# Patient Record
Sex: FEMALE | Race: BLACK OR AFRICAN AMERICAN | NOT HISPANIC OR LATINO
[De-identification: names, ages, dates, MRNs, and addresses within clinical notes are randomized per-mention and may not be internally consistent; named-entity substitution may affect disease eponyms.]

---

## 2018-10-09 PROBLEM — Z00.00 ENCOUNTER FOR PREVENTIVE HEALTH EXAMINATION: Status: ACTIVE | Noted: 2018-10-09

## 2018-10-11 ENCOUNTER — APPOINTMENT (OUTPATIENT)
Dept: OTOLARYNGOLOGY | Facility: CLINIC | Age: 78
End: 2018-10-11
Payer: COMMERCIAL

## 2018-10-11 VITALS
HEIGHT: 63 IN | DIASTOLIC BLOOD PRESSURE: 83 MMHG | SYSTOLIC BLOOD PRESSURE: 143 MMHG | BODY MASS INDEX: 26.93 KG/M2 | HEART RATE: 84 BPM | WEIGHT: 152 LBS

## 2018-10-11 DIAGNOSIS — Z82.2 FAMILY HISTORY OF DEAFNESS AND HEARING LOSS: ICD-10-CM

## 2018-10-11 DIAGNOSIS — Z78.9 OTHER SPECIFIED HEALTH STATUS: ICD-10-CM

## 2018-10-11 DIAGNOSIS — Z86.39 PERSONAL HISTORY OF OTHER ENDOCRINE, NUTRITIONAL AND METABOLIC DISEASE: ICD-10-CM

## 2018-10-11 PROCEDURE — 99204 OFFICE O/P NEW MOD 45 MIN: CPT

## 2018-10-11 RX ORDER — LEVOTHYROXINE SODIUM 137 UG/1
TABLET ORAL
Refills: 0 | Status: ACTIVE | COMMUNITY

## 2018-10-11 RX ORDER — FLUTICASONE PROPIONATE 50 UG/1
50 SPRAY, METERED NASAL DAILY
Qty: 1 | Refills: 3 | Status: ACTIVE | COMMUNITY
Start: 2018-10-11 | End: 1900-01-01

## 2018-11-13 ENCOUNTER — APPOINTMENT (OUTPATIENT)
Dept: OTOLARYNGOLOGY | Facility: CLINIC | Age: 78
End: 2018-11-13
Payer: MEDICARE

## 2018-11-13 VITALS
SYSTOLIC BLOOD PRESSURE: 152 MMHG | DIASTOLIC BLOOD PRESSURE: 87 MMHG | BODY MASS INDEX: 26.93 KG/M2 | WEIGHT: 152 LBS | HEIGHT: 63 IN | HEART RATE: 84 BPM

## 2018-11-13 PROCEDURE — 99214 OFFICE O/P EST MOD 30 MIN: CPT | Mod: 25

## 2018-11-13 PROCEDURE — 92567 TYMPANOMETRY: CPT

## 2018-11-13 PROCEDURE — 92557 COMPREHENSIVE HEARING TEST: CPT

## 2018-11-13 RX ORDER — METHYLPREDNISOLONE 4 MG/1
4 TABLET ORAL
Qty: 1 | Refills: 0 | Status: ACTIVE | COMMUNITY
Start: 2018-11-13 | End: 1900-01-01

## 2018-11-26 ENCOUNTER — FORM ENCOUNTER (OUTPATIENT)
Age: 78
End: 2018-11-26

## 2018-11-27 ENCOUNTER — APPOINTMENT (OUTPATIENT)
Dept: OTOLARYNGOLOGY | Facility: CLINIC | Age: 78
End: 2018-11-27
Payer: MEDICARE

## 2018-11-27 ENCOUNTER — OUTPATIENT (OUTPATIENT)
Dept: OUTPATIENT SERVICES | Facility: HOSPITAL | Age: 78
LOS: 1 days | End: 2018-11-27
Payer: COMMERCIAL

## 2018-11-27 ENCOUNTER — APPOINTMENT (OUTPATIENT)
Dept: MRI IMAGING | Facility: IMAGING CENTER | Age: 78
End: 2018-11-27
Payer: MEDICARE

## 2018-11-27 DIAGNOSIS — H91.8X1 OTHER SPECIFIED HEARING LOSS, RIGHT EAR: ICD-10-CM

## 2018-11-27 PROCEDURE — 70553 MRI BRAIN STEM W/O & W/DYE: CPT

## 2018-11-27 PROCEDURE — 70553 MRI BRAIN STEM W/O & W/DYE: CPT | Mod: 26

## 2018-11-27 PROCEDURE — A9585: CPT

## 2018-12-06 ENCOUNTER — APPOINTMENT (OUTPATIENT)
Dept: OTOLARYNGOLOGY | Facility: CLINIC | Age: 78
End: 2018-12-06
Payer: MEDICARE

## 2018-12-06 VITALS
WEIGHT: 152 LBS | BODY MASS INDEX: 26.93 KG/M2 | DIASTOLIC BLOOD PRESSURE: 88 MMHG | HEIGHT: 63 IN | HEART RATE: 80 BPM | SYSTOLIC BLOOD PRESSURE: 165 MMHG

## 2018-12-06 PROCEDURE — 99214 OFFICE O/P EST MOD 30 MIN: CPT | Mod: 25

## 2018-12-06 PROCEDURE — 31231 NASAL ENDOSCOPY DX: CPT

## 2019-02-15 ENCOUNTER — EMERGENCY (EMERGENCY)
Facility: HOSPITAL | Age: 79
LOS: 1 days | Discharge: HOME | End: 2019-02-15
Attending: EMERGENCY MEDICINE | Admitting: EMERGENCY MEDICINE
Payer: MEDICARE

## 2019-02-15 VITALS
DIASTOLIC BLOOD PRESSURE: 64 MMHG | OXYGEN SATURATION: 100 % | TEMPERATURE: 98 F | SYSTOLIC BLOOD PRESSURE: 122 MMHG | HEART RATE: 85 BPM | RESPIRATION RATE: 18 BRPM

## 2019-02-15 PROCEDURE — 99284 EMERGENCY DEPT VISIT MOD MDM: CPT | Mod: GC,25

## 2019-02-15 NOTE — ED ADULT TRIAGE NOTE - CHIEF COMPLAINT QUOTE
Pt BIBEMS NSLIJ from Home, c/o Epigastric pain with Dizziness, Nausea Vomiting Diarrhea, Generalized Body Weakness PMHx Hypothyroid Pt BIBEMS [facility]LIJ from Home, c/o Epigastric pain with Dizziness, Nausea Vomiting Diarrhea, Generalized Body Weakness PMHx Hypothyroid. Looks uncomfortable in Triage Pt BIBEMS NS[facility]J from Home, c/o Epigastric pain, constant since 9pm,non radiating with Dizziness, Nausea Vomiting Diarrhea, Generalized Body Weakness PMHx Hypothyroid. Looks uncomfortable in Triage

## 2019-02-16 VITALS
HEART RATE: 85 BPM | OXYGEN SATURATION: 100 % | RESPIRATION RATE: 16 BRPM | SYSTOLIC BLOOD PRESSURE: 146 MMHG | DIASTOLIC BLOOD PRESSURE: 79 MMHG | TEMPERATURE: 98 F

## 2019-02-16 LAB
ALBUMIN SERPL ELPH-MCNC: 4.6 G/DL — SIGNIFICANT CHANGE UP (ref 3.3–5)
ALP SERPL-CCNC: 92 U/L — SIGNIFICANT CHANGE UP (ref 40–120)
ALT FLD-CCNC: 13 U/L — SIGNIFICANT CHANGE UP (ref 4–33)
ANION GAP SERPL CALC-SCNC: 16 MMO/L — HIGH (ref 7–14)
AST SERPL-CCNC: 18 U/L — SIGNIFICANT CHANGE UP (ref 4–32)
BASE EXCESS BLDV CALC-SCNC: 4.7 MMOL/L — SIGNIFICANT CHANGE UP
BASOPHILS # BLD AUTO: 0.03 K/UL — SIGNIFICANT CHANGE UP (ref 0–0.2)
BASOPHILS NFR BLD AUTO: 0.4 % — SIGNIFICANT CHANGE UP (ref 0–2)
BILIRUB SERPL-MCNC: 0.5 MG/DL — SIGNIFICANT CHANGE UP (ref 0.2–1.2)
BLOOD GAS VENOUS - CREATININE: 0.71 MG/DL — SIGNIFICANT CHANGE UP (ref 0.5–1.3)
BUN SERPL-MCNC: 15 MG/DL — SIGNIFICANT CHANGE UP (ref 7–23)
CALCIUM SERPL-MCNC: 9.6 MG/DL — SIGNIFICANT CHANGE UP (ref 8.4–10.5)
CHLORIDE BLDV-SCNC: 104 MMOL/L — SIGNIFICANT CHANGE UP (ref 96–108)
CHLORIDE SERPL-SCNC: 101 MMOL/L — SIGNIFICANT CHANGE UP (ref 98–107)
CO2 SERPL-SCNC: 25 MMOL/L — SIGNIFICANT CHANGE UP (ref 22–31)
CREAT SERPL-MCNC: 0.73 MG/DL — SIGNIFICANT CHANGE UP (ref 0.5–1.3)
EOSINOPHIL # BLD AUTO: 0.05 K/UL — SIGNIFICANT CHANGE UP (ref 0–0.5)
EOSINOPHIL NFR BLD AUTO: 0.6 % — SIGNIFICANT CHANGE UP (ref 0–6)
GAS PNL BLDV: 141 MMOL/L — SIGNIFICANT CHANGE UP (ref 136–146)
GLUCOSE BLDV-MCNC: 180 — HIGH (ref 70–99)
GLUCOSE SERPL-MCNC: 177 MG/DL — HIGH (ref 70–99)
HCO3 BLDV-SCNC: 26 MMOL/L — SIGNIFICANT CHANGE UP (ref 20–27)
HCT VFR BLD CALC: 41.8 % — SIGNIFICANT CHANGE UP (ref 34.5–45)
HCT VFR BLDV CALC: 42.2 % — SIGNIFICANT CHANGE UP (ref 34.5–45)
HGB BLD-MCNC: 13.3 G/DL — SIGNIFICANT CHANGE UP (ref 11.5–15.5)
HGB BLDV-MCNC: 13.7 G/DL — SIGNIFICANT CHANGE UP (ref 11.5–15.5)
IMM GRANULOCYTES NFR BLD AUTO: 0.5 % — SIGNIFICANT CHANGE UP (ref 0–1.5)
LACTATE BLDV-MCNC: 2.4 MMOL/L — HIGH (ref 0.5–2)
LIDOCAIN IGE QN: 31.6 U/L — SIGNIFICANT CHANGE UP (ref 7–60)
LYMPHOCYTES # BLD AUTO: 1.39 K/UL — SIGNIFICANT CHANGE UP (ref 1–3.3)
LYMPHOCYTES # BLD AUTO: 16.4 % — SIGNIFICANT CHANGE UP (ref 13–44)
MCHC RBC-ENTMCNC: 28.2 PG — SIGNIFICANT CHANGE UP (ref 27–34)
MCHC RBC-ENTMCNC: 31.8 % — LOW (ref 32–36)
MCV RBC AUTO: 88.7 FL — SIGNIFICANT CHANGE UP (ref 80–100)
MONOCYTES # BLD AUTO: 0.57 K/UL — SIGNIFICANT CHANGE UP (ref 0–0.9)
MONOCYTES NFR BLD AUTO: 6.7 % — SIGNIFICANT CHANGE UP (ref 2–14)
NEUTROPHILS # BLD AUTO: 6.38 K/UL — SIGNIFICANT CHANGE UP (ref 1.8–7.4)
NEUTROPHILS NFR BLD AUTO: 75.4 % — SIGNIFICANT CHANGE UP (ref 43–77)
NRBC # FLD: 0 K/UL — LOW (ref 25–125)
PCO2 BLDV: 49 MMHG — SIGNIFICANT CHANGE UP (ref 41–51)
PH BLDV: 7.39 PH — SIGNIFICANT CHANGE UP (ref 7.32–7.43)
PLATELET # BLD AUTO: 262 K/UL — SIGNIFICANT CHANGE UP (ref 150–400)
PMV BLD: 9.7 FL — SIGNIFICANT CHANGE UP (ref 7–13)
PO2 BLDV: < 24 MMHG — LOW (ref 35–40)
POTASSIUM BLDV-SCNC: 3.5 MMOL/L — SIGNIFICANT CHANGE UP (ref 3.4–4.5)
POTASSIUM SERPL-MCNC: 3.9 MMOL/L — SIGNIFICANT CHANGE UP (ref 3.5–5.3)
POTASSIUM SERPL-SCNC: 3.9 MMOL/L — SIGNIFICANT CHANGE UP (ref 3.5–5.3)
PROT SERPL-MCNC: 7.4 G/DL — SIGNIFICANT CHANGE UP (ref 6–8.3)
RBC # BLD: 4.71 M/UL — SIGNIFICANT CHANGE UP (ref 3.8–5.2)
RBC # FLD: 12.4 % — SIGNIFICANT CHANGE UP (ref 10.3–14.5)
SAO2 % BLDV: 26.8 % — LOW (ref 60–85)
SODIUM SERPL-SCNC: 142 MMOL/L — SIGNIFICANT CHANGE UP (ref 135–145)
WBC # BLD: 8.46 K/UL — SIGNIFICANT CHANGE UP (ref 3.8–10.5)
WBC # FLD AUTO: 8.46 K/UL — SIGNIFICANT CHANGE UP (ref 3.8–10.5)

## 2019-02-16 PROCEDURE — 74177 CT ABD & PELVIS W/CONTRAST: CPT | Mod: 26

## 2019-02-16 RX ORDER — MECLIZINE HCL 12.5 MG
50 TABLET ORAL ONCE
Qty: 0 | Refills: 0 | Status: COMPLETED | OUTPATIENT
Start: 2019-02-16 | End: 2019-02-16

## 2019-02-16 RX ORDER — SODIUM CHLORIDE 9 MG/ML
1000 INJECTION INTRAMUSCULAR; INTRAVENOUS; SUBCUTANEOUS ONCE
Qty: 0 | Refills: 0 | Status: COMPLETED | OUTPATIENT
Start: 2019-02-16 | End: 2019-02-16

## 2019-02-16 RX ORDER — ONDANSETRON 8 MG/1
4 TABLET, FILM COATED ORAL ONCE
Qty: 0 | Refills: 0 | Status: COMPLETED | OUTPATIENT
Start: 2019-02-16 | End: 2019-02-16

## 2019-02-16 RX ORDER — MECLIZINE HCL 12.5 MG
1 TABLET ORAL
Qty: 30 | Refills: 0
Start: 2019-02-16 | End: 2019-02-25

## 2019-02-16 RX ADMIN — Medication 50 MILLIGRAM(S): at 02:14

## 2019-02-16 RX ADMIN — SODIUM CHLORIDE 1000 MILLILITER(S): 9 INJECTION INTRAMUSCULAR; INTRAVENOUS; SUBCUTANEOUS at 00:53

## 2019-02-16 RX ADMIN — ONDANSETRON 4 MILLIGRAM(S): 8 TABLET, FILM COATED ORAL at 01:17

## 2019-02-16 RX ADMIN — Medication 30 MILLILITER(S): at 01:17

## 2019-02-16 NOTE — ED PROVIDER NOTE - NS ED ROS FT
Please see HPI section of chart for further detailed Review of Systems    abd discomfort / pain  nausea, 3 episodes diarrhea, 4 episodes vomiting, weakness, lightheadedness, chills.

## 2019-02-16 NOTE — ED PROVIDER NOTE - PROGRESS NOTE DETAILS
Ct w/o acute pathology, labs grossly wnl  Patient reports improvement of symptoms  will d/c home with strict return precautions, meclizine, instructions to f/u with ENT  Marquise Quiñones MD, PGY2 Emergency Medicine

## 2019-02-16 NOTE — ED ADULT NURSE NOTE - NSIMPLEMENTINTERV_GEN_ALL_ED
Implemented All Universal Safety Interventions:  [place] to call system. Call bell, personal items and telephone within reach. Instruct patient to call for assistance. Room bathroom lighting operational. Non-slip footwear when patient is off stretcher. Physically safe environment: no spills, clutter or unnecessary equipment. Stretcher in lowest position, wheels locked, appropriate side rails in place.

## 2019-02-16 NOTE — ED PROVIDER NOTE - OBJECTIVE STATEMENT
78F, hx of HYPothyroid, HLD presents w chief complaint of left sided abdominal pain / discomfort. Per patient, sx began around 9pm tonight, associated w/ nausea, 3 episodes diarrhea, 4 episodes vomiting, weakness, lightheadedness, chills. abd discomfort constant, looks ill appearing. Denies fevers, chest pain, shortness of breath, cough, urinary sx, headache, dizziness, blurry vision. Denies prior hx similar. No recent illness, sick contacts, illness. No new foods or medications. Mo other individuals at home with similar symptoms. No tobacco, ethanol, or drug use. No hx abd surgery.

## 2019-02-16 NOTE — ED PROVIDER NOTE - ATTENDING CONTRIBUTION TO CARE
MD Andujar:  I performed a face to face bedside interview with patient regarding history of present illness, review of symptoms and past medical history. I completed an independent physical exam(documented below).  I have discussed patient's plan of care with resident.   I agree with note as stated above, having amended the EMR as needed to reflect my findings. I have discussed the assessment and plan of care.  This includes during the time I functioned as the attending physician for this patient.  PE:  Gen: Alert, NAD  Head: NC, AT,  EOMI; +horizontal nystagmus on Rightward gaze (7beats), normal lids/conjunctiva  ENT:  normal hearing, patent oropharynx without erythema/exudate  Neck: +supple, no tenderness/meningismus/JVD, +Trachea midline  Chest: no chest wall tenderness, equal chest rise  Pulm: Bilateral BS, normal resp effort, no wheeze/stridor/retractions  CV: tachy, no M/R/G, +dist pulses  Abd: +BS, soft, ND, mild left abd discomfort.  Rectal: deferred  Mskel: no edema/erythema/cyanosis  Skin: no rash  Neuro: AAOx3  MDM:  79yo F pmh of hypothyroidism, hld, c/o room-spinning dizziness last night, followed by vomiting and loose stools. Transient abd discomfort earlier, none at time of my evaluation. States she ate left overs ystdy afternoon that rest of family did not consume. Denies cp, sob, diaphoresis. No hx of cardiac dx. No PE risk factors.  Ddx includes vertigo (thought wouldn't explain loose stools) vs gastroenteritis vs diverticulitis (less likely).  Labs, meds, imaging, reassess.

## 2019-02-16 NOTE — ED PROVIDER NOTE - CLINICAL SUMMARY MEDICAL DECISION MAKING FREE TEXT BOX
78F, hx of HYPothyroid, HLD presents w chief complaint of left sided abdominal pain / discomfort. Associated with nausea, vomiting, diarrhea, chills, weakness. Exam as above. Concern for gastroenteritis vs diverticulitis vs other. Labs, CT, pain control, zofran, maalox. Currently stable, no acute distress. Will continue to follow up and re-assess. Case discussed with Attending  Marquise Quiñones MD, PGY2 Emergency Medicine

## 2019-02-16 NOTE — ED PROVIDER NOTE - NSFOLLOWUPCLINICS_GEN_ALL_ED_FT
[facility] - ENT  Otolaryngology (ENT)  [address] [place] [address]  Phone: [phone]  Fax:   Follow Up Time:

## 2019-02-16 NOTE — ED ADULT NURSE REASSESSMENT NOTE - NS ED NURSE REASSESS COMMENT FT1
break coverage RN: received report from FOUZIA Blair, pt resting comfortably in stretcher and in nad, will ctm.

## 2019-02-16 NOTE — ED ADULT NURSE NOTE - CHIEF COMPLAINT QUOTE
Pt BIBEMS NSLIJ from Home, c/o Epigastric pain, constant since 9pm,non radiating with Dizziness, Nausea Vomiting Diarrhea, Generalized Body Weakness PMHx Hypothyroid. Looks uncomfortable in Triage

## 2019-02-16 NOTE — ED PROVIDER NOTE - NSFOLLOWUPINSTRUCTIONS_ED_ALL_ED_FT
Please follow up with your primary medical doctor this week  Please follow up with ENT if your symptoms (dizziness, head spinning) recur or persist    Take Meclizine 25mg up to three times per day for symptoms relief (dizziness, nausea)    Return to hospital for any new or concerning symptoms, including but not limited to: fevers, chills, nausea, vomiting, headache, dizziness, lightheadedness, chest pain, shortness of breath, difficulty breathing, abdominal pain, weakness, or any other new or concerning symptoms.

## 2019-02-16 NOTE — ED PROVIDER NOTE - ABDOMINAL EXAM
mild tenderness to mid/left abd . no epigastric or right upper quadrant tenderness/soft/nondistended

## 2019-02-16 NOTE — ED ADULT NURSE NOTE - OBJECTIVE STATEMENT
Pt presents to room 28, A&Ox4, ambulatory at baseline w/o assistance, pmhx of hypothyroidism, here for evaluation of dizziness, nausea, vomiting, diarrhea x4 that began today at 9pm. Denies chest pain, shortness of breath, palpitations, diaphoresis, headaches, fevers, dizziness, nausea, vomiting, diarrhea, or urinary symptoms at this time. IV established in left AC with a 20G, labs drawn and sent, call bell in reach, warm blanket provided, bed in lowest position, side rails up x2. Will continue to monitor.

## 2019-03-14 ENCOUNTER — APPOINTMENT (OUTPATIENT)
Dept: OTOLARYNGOLOGY | Facility: CLINIC | Age: 79
End: 2019-03-14
Payer: MEDICARE

## 2019-03-14 VITALS
HEART RATE: 72 BPM | SYSTOLIC BLOOD PRESSURE: 143 MMHG | DIASTOLIC BLOOD PRESSURE: 86 MMHG | BODY MASS INDEX: 26.93 KG/M2 | HEIGHT: 63 IN | WEIGHT: 152 LBS

## 2019-03-14 DIAGNOSIS — H69.81 OTHER SPECIFIED DISORDERS OF EUSTACHIAN TUBE, RIGHT EAR: ICD-10-CM

## 2019-03-14 PROCEDURE — 99214 OFFICE O/P EST MOD 30 MIN: CPT

## 2019-03-14 RX ORDER — MECLIZINE HYDROCHLORIDE 12.5 MG/1
12.5 TABLET ORAL
Qty: 60 | Refills: 0 | Status: ACTIVE | COMMUNITY
Start: 2019-03-07

## 2019-03-14 RX ORDER — MECLIZINE HYDROCHLORIDE 25 MG/1
25 TABLET ORAL
Qty: 30 | Refills: 0 | Status: ACTIVE | COMMUNITY
Start: 2019-02-16

## 2019-03-14 NOTE — HISTORY OF PRESENT ILLNESS
[de-identified] : patient woke up a few weeks ago with right ear clogging and fullness. SHe tried to ignore it but as the day progressed it worsened and bothered her until the evening she felt minor dizziness. She got so dizzy that she had to lay down and the room was spinning and she felt nauseous about 5 minutes later where she threw up and had diarrhea. She felt faint after all of this and was taken to the hospital by ambulance still vomiting. She developed a pain in the left side of her body under her ribs. They thought it was food poisoning but she didn’t think it was. They did an abdominal CT and it was normal so they decided it was the inner ear causing her dizziness. She has been taking meclizine as needed for dizziness. She is scared now to drive and anxious at any time she feels even slightly dizzy.

## 2019-03-14 NOTE — ASSESSMENT
[FreeTextEntry1] : Patient he apparently had a bed upper respiratory tract infection that resulted in some dizziness and being hospitalized nauseousness and diarrhea. She was started on meclizine but couldn't really take it. She's feeling much better now. She remains clogged in her right ear. MRI in the past was negative. I sent her for a VNG  to definitively evaluate her vestibular status and strongly recommend she inserted a hearing aid for her right ear which she continues to feel feels clogged. This is due to her hearing loss.

## 2019-05-10 ENCOUNTER — APPOINTMENT (OUTPATIENT)
Dept: OTOLARYNGOLOGY | Facility: CLINIC | Age: 79
End: 2019-05-10
Payer: MEDICARE

## 2019-05-10 PROCEDURE — 92540 BASIC VESTIBULAR EVALUATION: CPT

## 2019-05-10 PROCEDURE — 92567 TYMPANOMETRY: CPT

## 2019-05-10 PROCEDURE — 92537 CALORIC VSTBLR TEST W/REC: CPT

## 2019-05-17 ENCOUNTER — APPOINTMENT (OUTPATIENT)
Dept: PHARMACY | Facility: CLINIC | Age: 79
End: 2019-05-17
Payer: SELF-PAY

## 2019-05-17 PROCEDURE — V5010C: CUSTOM | Mod: NC,RT

## 2019-09-05 ENCOUNTER — APPOINTMENT (OUTPATIENT)
Dept: OTOLARYNGOLOGY | Facility: CLINIC | Age: 79
End: 2019-09-05
Payer: MEDICARE

## 2019-09-05 VITALS
HEART RATE: 76 BPM | SYSTOLIC BLOOD PRESSURE: 137 MMHG | BODY MASS INDEX: 25.69 KG/M2 | WEIGHT: 145 LBS | HEIGHT: 63 IN | DIASTOLIC BLOOD PRESSURE: 75 MMHG

## 2019-09-05 DIAGNOSIS — H91.8X1 OTHER SPECIFIED HEARING LOSS, RIGHT EAR: ICD-10-CM

## 2019-09-05 DIAGNOSIS — H61.23 IMPACTED CERUMEN, BILATERAL: ICD-10-CM

## 2019-09-05 PROCEDURE — 69210 REMOVE IMPACTED EAR WAX UNI: CPT

## 2019-09-05 PROCEDURE — 99214 OFFICE O/P EST MOD 30 MIN: CPT | Mod: 25

## 2019-09-05 NOTE — HISTORY OF PRESENT ILLNESS
[de-identified] : PAtient has been feeling like her hearing is the same but she still does not want to use hearnig aids. SHe felt nosies in the ears last week, this comes and goes like an air coming out of the ear. This morning she had a runny nose and her right ear felt mildly clogged. She does not have any current nasal congestion right now. She does have an issue with dizziness as well that comes and goes  but right now she is doing well. Her VNG was done awhile back and the results were discussing with her from Dr Farmer. THe neurologist she saw would like to talk to Dr Farmer as well to discuss the plan and whats happening with the noise in the ears.

## 2019-09-05 NOTE — REVIEW OF SYSTEMS
[Hearing Loss] : hearing loss [Ear Noises] : ear noises [Dizziness] : dizziness [Negative] : Heme/Lymph [de-identified] : no current vertigo

## 2019-09-05 NOTE — ASSESSMENT
[FreeTextEntry1] : Patient follows up with vestibular status was essentially normal body the NG she continues to have tinnitus that she understands may go away on its own but there's no treatment pedal automatically make a coli. Some cerumen impaction bilaterally which was curetted out she cannot tolerate any hearing aides and wants to followup with us on an annual basis for cerumen management.

## 2019-11-23 ENCOUNTER — EMERGENCY (EMERGENCY)
Facility: HOSPITAL | Age: 79
LOS: 1 days | Discharge: HOME | End: 2019-11-23
Attending: EMERGENCY MEDICINE | Admitting: EMERGENCY MEDICINE
Payer: MEDICARE

## 2019-11-23 VITALS
RESPIRATION RATE: 17 BRPM | OXYGEN SATURATION: 94 % | HEART RATE: 70 BPM | SYSTOLIC BLOOD PRESSURE: 117 MMHG | TEMPERATURE: 98 F | DIASTOLIC BLOOD PRESSURE: 77 MMHG

## 2019-11-23 VITALS
SYSTOLIC BLOOD PRESSURE: 163 MMHG | HEART RATE: 85 BPM | TEMPERATURE: 98 F | DIASTOLIC BLOOD PRESSURE: 82 MMHG | RESPIRATION RATE: 17 BRPM | OXYGEN SATURATION: 100 %

## 2019-11-23 PROCEDURE — 99284 EMERGENCY DEPT VISIT MOD MDM: CPT

## 2019-11-23 PROCEDURE — 72125 CT NECK SPINE W/O DYE: CPT | Mod: 26

## 2019-11-23 PROCEDURE — 70450 CT HEAD/BRAIN W/O DYE: CPT | Mod: 26

## 2019-11-23 RX ORDER — SODIUM CHLORIDE 9 MG/ML
500 INJECTION INTRAMUSCULAR; INTRAVENOUS; SUBCUTANEOUS ONCE
Refills: 0 | Status: COMPLETED | OUTPATIENT
Start: 2019-11-23 | End: 2019-11-23

## 2019-11-23 RX ORDER — ACETAMINOPHEN 500 MG
650 TABLET ORAL ONCE
Refills: 0 | Status: COMPLETED | OUTPATIENT
Start: 2019-11-23 | End: 2019-11-23

## 2019-11-23 RX ORDER — METOCLOPRAMIDE HCL 10 MG
10 TABLET ORAL ONCE
Refills: 0 | Status: COMPLETED | OUTPATIENT
Start: 2019-11-23 | End: 2019-11-23

## 2019-11-23 RX ADMIN — SODIUM CHLORIDE 500 MILLILITER(S): 9 INJECTION INTRAMUSCULAR; INTRAVENOUS; SUBCUTANEOUS at 18:50

## 2019-11-23 RX ADMIN — Medication 10 MILLIGRAM(S): at 18:49

## 2019-11-23 RX ADMIN — Medication 650 MILLIGRAM(S): at 18:48
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
## 2019-11-23 NOTE — ED ADULT NURSE NOTE - OBJECTIVE STATEMENT
Pt received in intake 10C, A&OX4, NAD.  c/o overall headache since yesterday.  Denies any vision changes.  Pt denies taking any medications for headache.  Pt nausea/vomiting.  Pt appears comfortable. IVL placed, no labs required at this time, medicated as per MD orders.  Pt pending CT scan.

## 2019-11-23 NOTE — ED ADULT NURSE NOTE - NSIMPLEMENTINTERV_GEN_ALL_ED
Implemented All Universal Safety Interventions:  [place] to call system. Call bell, personal items and telephone within reach. Instruct patient to call for assistance. Room bathroom lighting operational. Non-slip footwear when patient is off stretcher. Physically safe environment: no spills, clutter or unnecessary equipment. Stretcher in lowest position, wheels locked, appropriate side rails in place.

## 2019-11-23 NOTE — ED PROVIDER NOTE - NSFOLLOWUPINSTRUCTIONS_ED_ALL_ED_FT
A cause for your symptoms was notfound. You might have a concussion.     Please take Ibuprofen 600 mg every 6 hours and/or Tylenol 650 mg every 6 hours as needed for pain.     Return to the ER for uncontrolled pain, fevers, vomiting, vision changes, or other concerning signs since you may need more testing . Be sure to follow up with your doctor in 3-4 days, especially if you're not feeling better.
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
## 2019-11-23 NOTE — ED PROVIDER NOTE - PROGRESS NOTE DETAILS
Pain improved somewhat, though still mildly present . CT reviewed c pt . To f/u c PMD. Return precautiosn reviwed

## 2020-05-21 PROBLEM — E03.9 HYPOTHYROIDISM, UNSPECIFIED: Chronic | Status: ACTIVE | Noted: 2019-11-23

## 2020-06-01 ENCOUNTER — APPOINTMENT (OUTPATIENT)
Dept: OTOLARYNGOLOGY | Facility: CLINIC | Age: 80
End: 2020-06-01
Payer: MEDICARE

## 2020-06-01 VITALS — DIASTOLIC BLOOD PRESSURE: 83 MMHG | HEART RATE: 84 BPM | TEMPERATURE: 98.42 F | SYSTOLIC BLOOD PRESSURE: 133 MMHG

## 2020-06-01 DIAGNOSIS — R51 HEADACHE: ICD-10-CM

## 2020-06-01 DIAGNOSIS — H90.3 SENSORINEURAL HEARING LOSS, BILATERAL: ICD-10-CM

## 2020-06-01 DIAGNOSIS — H61.22 IMPACTED CERUMEN, LEFT EAR: ICD-10-CM

## 2020-06-01 DIAGNOSIS — H69.82 OTHER SPECIFIED DISORDERS OF EUSTACHIAN TUBE, LEFT EAR: ICD-10-CM

## 2020-06-01 DIAGNOSIS — R42 DIZZINESS AND GIDDINESS: ICD-10-CM

## 2020-06-01 PROCEDURE — 99214 OFFICE O/P EST MOD 30 MIN: CPT | Mod: 25

## 2020-06-01 PROCEDURE — 69210 REMOVE IMPACTED EAR WAX UNI: CPT

## 2020-06-01 NOTE — HISTORY OF PRESENT ILLNESS
[de-identified] : PAtient is starting to have pain and headache on the left side of the head starting in the left ear. It radiates to the top of the head. SHe cannot tell if the hearing in the left ear has been affected. SHe does not have any drainage from the ear or dizziness recently. SHe had the VNG last year that was normal.

## 2020-06-01 NOTE — REVIEW OF SYSTEMS
[Ear Pain] : ear pain [Dizziness] : dizziness [Hearing Loss] : hearing loss [Ear Noises] : ear noises [Negative] : Heme/Lymph [FreeTextEntry4] : generalized headache as per HPI

## 2020-06-01 NOTE — ASSESSMENT
[FreeTextEntry1] : Patient follows up been under tremendous amount of stress during COVID feeling some pain in the left temporal region radiating from her ear on examination her ear has some moderate amount of wax which was curetted out she does have tenderness over the left temporomandibular joint region this is most likely etiology of her symptoms the rest of examinations essentially within normal limits the good news is that her tinnitus comes and goes she does not want to pursue with a hearing aid at this time even though she knows that she is a candidate for hearing aid.  Work-up MRI and VNG's have been negative she was reassured she will follow-up with us as needed.  In the meantime she will take Motrin for her discomfort I fully expect her symptoms to resolve and she understands is stress related.

## 2020-08-06 ENCOUNTER — APPOINTMENT (OUTPATIENT)
Dept: ORTHOPEDIC SURGERY | Facility: CLINIC | Age: 80
End: 2020-08-06
Payer: MEDICARE

## 2020-08-06 VITALS — WEIGHT: 140 LBS | HEIGHT: 62 IN | BODY MASS INDEX: 25.76 KG/M2

## 2020-08-06 DIAGNOSIS — M70.71 OTHER BURSITIS OF HIP, RIGHT HIP: ICD-10-CM

## 2020-08-06 DIAGNOSIS — M70.72 OTHER BURSITIS OF HIP, RIGHT HIP: ICD-10-CM

## 2020-08-06 DIAGNOSIS — M47.816 SPONDYLOSIS W/OUT MYELOPATHY OR RADICULOPATHY, LUMBAR REGION: ICD-10-CM

## 2020-08-06 DIAGNOSIS — S90.32XA CONTUSION OF LEFT FOOT, INITIAL ENCOUNTER: ICD-10-CM

## 2020-08-06 PROCEDURE — 99204 OFFICE O/P NEW MOD 45 MIN: CPT

## 2020-08-06 PROCEDURE — 72170 X-RAY EXAM OF PELVIS: CPT

## 2020-08-06 PROCEDURE — 72100 X-RAY EXAM L-S SPINE 2/3 VWS: CPT

## 2020-08-06 NOTE — PHYSICAL EXAM
[de-identified] : Constitutional\par o Appearance : well-nourished, well developed, alert, in no acute distress \par Head and Face\par o Head :\par ¦ Inspection : atraumatic, normocephalic\par o Face :\par ¦ Inspection : no visible rash or discoloration\par Respiratory\par o Respiratory Effort: breathing unlabored \par Neurologic\par o Mental Status Examination :\par ¦ Orientation : alert and oriented X 3\par Psychiatric\par o Mood and Affect: mood normal, affect appropriate\par Cardiovascular\par o Observation/Palpation : - no swelling\par Lymphatic\par o Additional Nodes : No palpable lymph nodes present\par \par Lumbosacral Spine\par o Inspection : no visible rash or discoloration\par o Palpation : paraspinal musculature nontender\par o Range of Motion : rotation to the left cause discomfort, no discomfort with extension, sidebending or rotation to the right\par o Muscle Strength : paraspinal muscle strength and tone within normal limits\par o Muscle Tone : paraspinal muscle strength and tone within normal limits\par Tests: straight leg test negative\par  \par Right Lower Extremity\par o Buttock : no tenderness, swelling or deformities\par o Right Hip :\par ¦ Inspection/Palpation : no tenderness, swelling or deformities\par ¦ Range of Motion : full and painless in all planes, no crepitance\par ¦ Stability : joint stability intact\par ¦ Strength : extension, flexion, adduction, abduction, internal rotation and external rotation, 4+/5 \par \par Left Lower Extremity\par o Buttock : no tenderness, swelling or deformities \par o Left Hip :\par ¦ Inspection/Palpation : greater trochanteric and IT band tenderness, no swelling or deformities\par ¦ Range of Motion : full flexion, pain with full external rotation, mild pain with full internal rotation, no crepitance\par ¦ Stability : joint stability intact\par ¦ Strength : extension, flexion, adduction, abduction, internal rotation and external rotation, 3+/5\par \par o Left Ankle :\par ¦ Inspection/Palpation : no tenderness to palpation, no swelling    \par ¦ Range of Motion : arc of motion full and painless in all planes\par ¦ Stability : no joint instability en provocative testing\par ¦ Strength : all muscles 5/5\par o Skin : no erythema or ecchymosis present\par o Sensation : sensation to pin intact\par ¦ Tests and Signs : Blake's test negative \par \par o Left Foot:\par ¦ Inspection/Palpation : tenderness over the plantar fascia, no tenderness over the calcaneus, no swelling\par ¦ Range of Motion : arc of motion full and painless in all planes\par ¦ Stability : no joint instability on provocative testing\par ¦ Strength : all muscles 5/5\par o Skin : no erythema or ecchymosis present\par  \par Gait: kyphotic, antalgic on the left, limping on the left, no significant extremity swelling or lymphedema, moderately limited core strength\par \par Radiology Results \par o Lumbosacral Spine : AP and lateral views were obtained and reveal diffuse facet arthropathy with foraminal stenosis at L5/S1, evidence of a gallbladder surgery.\par o Pelvis: AP pelvis was obtained and revealed 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

## 2020-08-06 NOTE — HISTORY OF PRESENT ILLNESS
[de-identified] : 80 year old female presents complaining of left leg pain, worse in the thigh and ankle, that started about 1 month ago. She had been doing some walking exercises in her house with flip flops when she first felt the discomfort in her ankle and foot. Her pain has since migrated towards her left thigh and hip. She has mild low back pain. She denies any falls. She denies numbness and tingling into her legs and feet. She has pain when walking and standing, as well as when laying on her left side at night. She has not been treated for this in the past. She presented with lab work that was reviewed with her, and her CBC, sedimentation rate, and CRP were all normal. She is a retired medical technician.

## 2020-08-06 NOTE — DISCUSSION/SUMMARY
[de-identified] : I discussed the underlying pathophysiology of the patient's condition in great detail with the patient. I went over the patient's x-rays with them in great detail. We discussed the use of ice, Tylenol and anti-inflammatories to relieve pain. The patient was instructed in ROM exercises they are to do at home. At-home strengthening, and stretching exercises were discussed and demonstrated with the patient. We went over the wide ranging benefits of exercise for the patient health and I encouraged her to begin a diligent exercise program. She should avoid any heavy lifting, carrying, or overhead activities. She should not lift anything higher than 90° past her shoulder level. At this time, she will start a course of physical therapy for strengthening and flexibility. A prescription for physical therapy was provided. A discussion was had about shoe-wear modifications. I advised the patient to utilize a supportive sneaker that has a thicker sole. I recommend that she can take Celebrex to better manage her pain, and a prescription was provided. All of her questions were answered. She understands and consents to the plan. This treatment plan was discussed and reviewed with the patient's daughter who agrees with the treatment course.\par \par FU 1 month.\par after undergoing PT for her left hip and left ankle.\par after taking Celebrex to better manage her pain.\par after shoe wear modifications.

## 2020-08-06 NOTE — CONSULT LETTER
[Dear  ___] : Dear  [unfilled], [Consult Letter:] : I had the pleasure of evaluating your patient, [unfilled]. [Please see my note below.] : Please see my note below. [Consult Closing:] : Thank you very much for allowing me to participate in the care of this patient.  If you have any questions, please do not hesitate to contact me. [Sincerely,] : Sincerely, [FreeTextEntry3] : Popeye Pinto M.D.

## 2020-08-06 NOTE — ADDENDUM
[FreeTextEntry1] : I, Herbert Mckee, acted solely as a scribe for Dr. Popeye Pinto on this date 08/06/2020.\par All medical record entries made by the Scribe were at my, Dr. Popeye Pinto, direction and personally dictated by me on 08/06/2020. I have reviewed the chart and agree that the record accurately reflects my personal performance of the history, physical exam, assessment and plan. I have also personally directed, reviewed, and agreed with the chart.

## 2020-08-10 RX ORDER — CELECOXIB 200 MG/1
200 CAPSULE ORAL
Qty: 30 | Refills: 3 | Status: ACTIVE | COMMUNITY
Start: 2020-08-10 | End: 1900-01-01

## 2020-09-14 ENCOUNTER — APPOINTMENT (OUTPATIENT)
Dept: ORTHOPEDIC SURGERY | Facility: CLINIC | Age: 80
End: 2020-09-14

## 2021-01-18 ENCOUNTER — RESULT REVIEW (OUTPATIENT)
Age: 81
End: 2021-01-18

## 2021-02-01 ENCOUNTER — APPOINTMENT (OUTPATIENT)
Dept: ORTHOPEDIC SURGERY | Facility: CLINIC | Age: 81
End: 2021-02-01

## 2021-02-10 ENCOUNTER — RESULT REVIEW (OUTPATIENT)
Age: 81
End: 2021-02-10

## 2021-02-21 ENCOUNTER — RESULT REVIEW (OUTPATIENT)
Age: 81
End: 2021-02-21

## 2021-02-22 ENCOUNTER — RESULT REVIEW (OUTPATIENT)
Age: 81
End: 2021-02-22

## 2021-07-07 ENCOUNTER — RESULT REVIEW (OUTPATIENT)
Age: 81
End: 2021-07-07

## 2021-08-28 ENCOUNTER — EMERGENCY (EMERGENCY)
Facility: HOSPITAL | Age: 81
LOS: 1 days | Discharge: HOME | End: 2021-08-28
Attending: EMERGENCY MEDICINE | Admitting: EMERGENCY MEDICINE
Payer: MEDICARE

## 2021-08-28 VITALS
RESPIRATION RATE: 18 BRPM | SYSTOLIC BLOOD PRESSURE: 138 MMHG | OXYGEN SATURATION: 100 % | HEART RATE: 75 BPM | DIASTOLIC BLOOD PRESSURE: 88 MMHG

## 2021-08-28 LAB
ALBUMIN SERPL ELPH-MCNC: 5.1 G/DL — HIGH (ref 3.3–5)
ALP SERPL-CCNC: 87 U/L — SIGNIFICANT CHANGE UP (ref 40–120)
ALT FLD-CCNC: 19 U/L — SIGNIFICANT CHANGE UP (ref 4–33)
ANION GAP SERPL CALC-SCNC: 17 MMOL/L — HIGH (ref 7–14)
APPEARANCE UR: CLEAR — SIGNIFICANT CHANGE UP
AST SERPL-CCNC: 21 U/L — SIGNIFICANT CHANGE UP (ref 4–32)
BASOPHILS # BLD AUTO: 0.02 K/UL — SIGNIFICANT CHANGE UP (ref 0–0.2)
BASOPHILS NFR BLD AUTO: 0.2 % — SIGNIFICANT CHANGE UP (ref 0–2)
BILIRUB SERPL-MCNC: 1.2 MG/DL — SIGNIFICANT CHANGE UP (ref 0.2–1.2)
BILIRUB UR-MCNC: NEGATIVE — SIGNIFICANT CHANGE UP
BUN SERPL-MCNC: 11 MG/DL — SIGNIFICANT CHANGE UP (ref 7–23)
CALCIUM SERPL-MCNC: 9.8 MG/DL — SIGNIFICANT CHANGE UP (ref 8.4–10.5)
CHLORIDE SERPL-SCNC: 100 MMOL/L — SIGNIFICANT CHANGE UP (ref 98–107)
CO2 SERPL-SCNC: 22 MMOL/L — SIGNIFICANT CHANGE UP (ref 22–31)
COLOR SPEC: SIGNIFICANT CHANGE UP
CREAT SERPL-MCNC: 0.61 MG/DL — SIGNIFICANT CHANGE UP (ref 0.5–1.3)
DIFF PNL FLD: NEGATIVE — SIGNIFICANT CHANGE UP
EOSINOPHIL # BLD AUTO: 0.02 K/UL — SIGNIFICANT CHANGE UP (ref 0–0.5)
EOSINOPHIL NFR BLD AUTO: 0.2 % — SIGNIFICANT CHANGE UP (ref 0–6)
GLUCOSE SERPL-MCNC: 140 MG/DL — HIGH (ref 70–99)
GLUCOSE UR QL: NEGATIVE — SIGNIFICANT CHANGE UP
HCT VFR BLD CALC: 43.4 % — SIGNIFICANT CHANGE UP (ref 34.5–45)
HGB BLD-MCNC: 14.1 G/DL — SIGNIFICANT CHANGE UP (ref 11.5–15.5)
IANC: 6.39 K/UL — SIGNIFICANT CHANGE UP (ref 1.5–8.5)
IMM GRANULOCYTES NFR BLD AUTO: 0.3 % — SIGNIFICANT CHANGE UP (ref 0–1.5)
KETONES UR-MCNC: ABNORMAL
LEUKOCYTE ESTERASE UR-ACNC: NEGATIVE — SIGNIFICANT CHANGE UP
LIDOCAIN IGE QN: 24 U/L — SIGNIFICANT CHANGE UP (ref 7–60)
LYMPHOCYTES # BLD AUTO: 1.84 K/UL — SIGNIFICANT CHANGE UP (ref 1–3.3)
LYMPHOCYTES # BLD AUTO: 20.9 % — SIGNIFICANT CHANGE UP (ref 13–44)
MCHC RBC-ENTMCNC: 28.3 PG — SIGNIFICANT CHANGE UP (ref 27–34)
MCHC RBC-ENTMCNC: 32.5 GM/DL — SIGNIFICANT CHANGE UP (ref 32–36)
MCV RBC AUTO: 87.1 FL — SIGNIFICANT CHANGE UP (ref 80–100)
MONOCYTES # BLD AUTO: 0.5 K/UL — SIGNIFICANT CHANGE UP (ref 0–0.9)
MONOCYTES NFR BLD AUTO: 5.7 % — SIGNIFICANT CHANGE UP (ref 2–14)
NEUTROPHILS # BLD AUTO: 6.39 K/UL — SIGNIFICANT CHANGE UP (ref 1.8–7.4)
NEUTROPHILS NFR BLD AUTO: 72.7 % — SIGNIFICANT CHANGE UP (ref 43–77)
NITRITE UR-MCNC: NEGATIVE — SIGNIFICANT CHANGE UP
NRBC # BLD: 0 /100 WBCS — SIGNIFICANT CHANGE UP
NRBC # FLD: 0 K/UL — SIGNIFICANT CHANGE UP
PH UR: 8 — SIGNIFICANT CHANGE UP (ref 5–8)
PLATELET # BLD AUTO: 262 K/UL — SIGNIFICANT CHANGE UP (ref 150–400)
POTASSIUM SERPL-MCNC: 3 MMOL/L — LOW (ref 3.5–5.3)
POTASSIUM SERPL-SCNC: 3 MMOL/L — LOW (ref 3.5–5.3)
PROT SERPL-MCNC: 8.1 G/DL — SIGNIFICANT CHANGE UP (ref 6–8.3)
PROT UR-MCNC: ABNORMAL
RBC # BLD: 4.98 M/UL — SIGNIFICANT CHANGE UP (ref 3.8–5.2)
RBC # FLD: 12.6 % — SIGNIFICANT CHANGE UP (ref 10.3–14.5)
SODIUM SERPL-SCNC: 139 MMOL/L — SIGNIFICANT CHANGE UP (ref 135–145)
SP GR SPEC: 1.02 — SIGNIFICANT CHANGE UP (ref 1–1.05)
TROPONIN T, HIGH SENSITIVITY RESULT: <6 NG/L — SIGNIFICANT CHANGE UP
TSH SERPL-MCNC: 0.84 UIU/ML — SIGNIFICANT CHANGE UP (ref 0.27–4.2)
UROBILINOGEN FLD QL: SIGNIFICANT CHANGE UP
WBC # BLD: 8.8 K/UL — SIGNIFICANT CHANGE UP (ref 3.8–10.5)
WBC # FLD AUTO: 8.8 K/UL — SIGNIFICANT CHANGE UP (ref 3.8–10.5)

## 2021-08-28 PROCEDURE — 99285 EMERGENCY DEPT VISIT HI MDM: CPT | Mod: 25

## 2021-08-28 PROCEDURE — 71046 X-RAY EXAM CHEST 2 VIEWS: CPT | Mod: 26

## 2021-08-28 PROCEDURE — 93010 ELECTROCARDIOGRAM REPORT: CPT

## 2021-08-28 RX ORDER — SODIUM CHLORIDE 9 MG/ML
500 INJECTION INTRAMUSCULAR; INTRAVENOUS; SUBCUTANEOUS ONCE
Refills: 0 | Status: COMPLETED | OUTPATIENT
Start: 2021-08-28 | End: 2021-08-28

## 2021-08-28 RX ORDER — SODIUM CHLORIDE 9 MG/ML
1000 INJECTION INTRAMUSCULAR; INTRAVENOUS; SUBCUTANEOUS ONCE
Refills: 0 | Status: COMPLETED | OUTPATIENT
Start: 2021-08-28 | End: 2021-08-28

## 2021-08-28 RX ORDER — ONDANSETRON 8 MG/1
4 TABLET, FILM COATED ORAL ONCE
Refills: 0 | Status: COMPLETED | OUTPATIENT
Start: 2021-08-28 | End: 2021-08-28

## 2021-08-28 RX ORDER — POTASSIUM CHLORIDE 20 MEQ
10 PACKET (EA) ORAL
Refills: 0 | Status: COMPLETED | OUTPATIENT
Start: 2021-08-28 | End: 2021-08-28

## 2021-08-28 RX ORDER — MAGNESIUM SULFATE 500 MG/ML
2 VIAL (ML) INJECTION ONCE
Refills: 0 | Status: COMPLETED | OUTPATIENT
Start: 2021-08-28 | End: 2021-08-28

## 2021-08-28 RX ORDER — POTASSIUM CHLORIDE 20 MEQ
40 PACKET (EA) ORAL ONCE
Refills: 0 | Status: COMPLETED | OUTPATIENT
Start: 2021-08-28 | End: 2021-08-28

## 2021-08-28 RX ORDER — MECLIZINE HCL 12.5 MG
25 TABLET ORAL ONCE
Refills: 0 | Status: COMPLETED | OUTPATIENT
Start: 2021-08-28 | End: 2021-08-28

## 2021-08-28 RX ADMIN — Medication 25 MILLIGRAM(S): at 17:14

## 2021-08-28 RX ADMIN — SODIUM CHLORIDE 500 MILLILITER(S): 9 INJECTION INTRAMUSCULAR; INTRAVENOUS; SUBCUTANEOUS at 22:17

## 2021-08-28 RX ADMIN — SODIUM CHLORIDE 1000 MILLILITER(S): 9 INJECTION INTRAMUSCULAR; INTRAVENOUS; SUBCUTANEOUS at 17:16

## 2021-08-28 RX ADMIN — Medication 100 MILLIEQUIVALENT(S): at 22:02

## 2021-08-28 RX ADMIN — Medication 100 MILLIEQUIVALENT(S): at 23:15

## 2021-08-28 RX ADMIN — Medication 40 MILLIEQUIVALENT(S): at 19:54

## 2021-08-28 RX ADMIN — Medication 100 MILLIEQUIVALENT(S): at 20:03

## 2021-08-28 RX ADMIN — ONDANSETRON 4 MILLIGRAM(S): 8 TABLET, FILM COATED ORAL at 17:14

## 2021-08-28 RX ADMIN — Medication 50 GRAM(S): at 20:53

## 2021-08-28 NOTE — ED PROVIDER NOTE - PHYSICAL EXAMINATION
Dr Peralta  mmm. non icteric. no juandice.  no facial asymmetry no slurred speech supple neck, nl rom  normal S1-S2  no work of breathing.  soft nondistended, nontender on exam. no guarding or rebound   no pedal edema. no calf tenderness. normal pulses bilateral feet.  AO3 no focal deficits

## 2021-08-28 NOTE — ED PROVIDER NOTE - OBJECTIVE STATEMENT
Dr Peralta  80yo F hx of HTN, HLD, hypothyroidism, hx vertigo pw one day of nausea/vomit and abdominal pain. Couple episodes of NBNB vomit, diffuse abdominal pain and lose non bloody  diarrhea. no fever. no cp or sob. Endorse feeling dizzy today, described as a spinning sensation. Similar to prior episodes of vertigo, had a medication  "only when I needed it"  cant recall name. Denies any headache Denies any slurred speech or blurred vision. no focal numbness or weakness. Vaccinated for covid. Prior choley.

## 2021-08-28 NOTE — ED ADULT NURSE NOTE - PRO INTERPRETER NEED 2
Subjective





- Date & Time of Evaluation


Date of Evaluation: 02/20/18


Time of Evaluation: 10:04





- Subjective


Subjective: 


Surgery progress note Dr. Prescott


Patient seen and examined at bedside, no fevers or chills, patient states pain 

is better in left foot after pain medications yesterday.  Wound dressing and 

packing changed at bedside.





Objective





- Vital Signs/Intake and Output


Vital Signs (last 24 hours): 


 











Temp Pulse Resp BP Pulse Ox


 


 98.6 F   79   16   138/60   95 


 


 02/20/18 07:35  02/20/18 07:35  02/20/18 07:35  02/20/18 09:12  02/20/18 07:35








Intake and Output: 


 











 02/20/18 02/20/18





 06:59 18:59


 


Intake Total 1020 


 


Output Total 2550 


 


Balance -1530 














- Medications


Medications: 


 Current Medications





Acetaminophen (Tylenol 325mg Tab)  650 mg PO Q6 PRN


   PRN Reason: Pain, Mild (1-3)


   Last Admin: 02/16/18 18:43 Dose:  650 mg


Amlodipine [facility]ylate (Norvasc)  [facility]


   Last Admin: 02/20/18 09:12 Dose:  10 mg


Aspirin (Ecotrin)  81 mg PO 0800 [facility]


   Last Admin: 02/20/18 08:23 Dose:  81 mg


Atorvastatin Calcium (Lipitor)  40 mg PO DIN [facility]


   Last Admin: 02/19/18 17:23 Dose:  Not Given


Chlorpromazine (Thorazine)  25 mg PO Q6 [facility]


   PRN Reason: Protocol


   Last Admin: 02/20/18 05:58 Dose:  Not Given


Docusate Sodium (Colace)  100 mg PO BID [facility]


   Last Admin: 02/20/18 09:12 Dose:  100 mg


Escitalopram Oxalate (Lexapro)  5 mg PO QAM [facility]


   Last Admin: 02/20/18 09:12 Dose:  5 mg


Famotidine (Pepcid)  [facility]


   Last Admin: 02/20/18 09:12 Dose:  20 mg


Heparin Sodium (Porcine) (Heparin)  5,000 units SC Q8 [facility]


   PRN Reason: Protocol


   Last Admin: 02/20/18 06:03 Dose:  5,000 units


Meropenem/Sodium Chloride (Meropenem 1g/Ns 100ml Ivpb)  1 gm in 100 mls @ 100 

mls/hr IVPB Q8 [facility]


   PRN Reason: Protocol


   Stop: 02/23/18 06:01


   Last Admin: 02/20/18 06:03 Dose:  100 mls/hr


Insulin Detemir (Levemir)  12 unit SC HS [facility]


   Last Admin: 02/19/18 22:26 Dose:  12 unit


Insulin Human Regular (Humulin R Med)  0 units SC ACHS [facility]


   PRN Reason: Protocol


   Last Admin: 02/20/18 08:22 Dose:  3 units


Insulin Lispro Protam/Lispro Human (Humalog Mix 75/25)  8 units SC ACBD [facility]


   Last Admin: 02/20/18 09:05 Dose:  8 unit


Metoclopramide HCl (Reglan)  10 mg PO 0600,1130,1630,2200 [facility]


   Last Admin: 02/20/18 05:58 Dose:  Not Given


Metoprolol Tartrate (Lopressor)  50 mg PO 0800,1800 [facility]


   Last Admin: 02/20/18 08:23 Dose:  50 mg


Mycophenolate Mofetil (Cellcept Cap)  750 mg PO BID [facility]


   Last Admin: 02/20/18 09:12 Dose:  750 mg


Ondansetron HCl (Zofran Inj)  4 mg IVP Q6 PRN


   PRN Reason: Nausea/Vomiting


   Last Admin: 02/20/18 01:36 Dose:  4 mg


Ondansetron HCl (Zofran Inj)  4 mg IVP ONCE PRN


   PRN Reason: Nausea/Vomiting


Oxycodone HCl (Oxycodone Immediate Release Tab)  5 mg PO Q4H PRN


   PRN Reason: Pain, moderate (4-7)


   Last Admin: 02/20/18 09:13 Dose:  5 mg


Polyethylene Glycol (Miralax)  17 gm PO BID [facility]


   Last Admin: 02/20/18 09:12 Dose:  17 gm


Prednisone (Prednisone Tab)  5 mg PO 0800 [facility]


   Last Admin: 02/20/18 08:23 Dose:  5 mg


Sodium Hypochlorite (Dakins Solution 0.25%)  0 ml TOP DAILY [facility]


   Last Admin: 02/20/18 09:07 Dose:  Not Given


Tacrolimus (Prograf Cap)  0.5 mg PO [facility]


   Last Admin: 02/19/18 22:26 Dose:  0.5 mg


Tacrolimus (Prograf Cap)  2 mg PO BID [facility]


   Last Admin: 02/19/18 17:24 Dose:  Not Given











- Labs


Labs: 


 





 02/20/18 06:15 





 02/20/18 06:00 





 











PT  13.5 SECONDS (9.4-12.5)  H  02/12/18  07:00    


 


INR  1.17  (0.93-1.08)  H  02/12/18  07:00    


 


APTT  30.6 Seconds (25.1-36.5)   02/12/18  07:00    














- Constitutional


Appears: Well





- Head Exam


Head Exam: ATRAUMATIC, NORMAL INSPECTION, NORMOCEPHALIC





- Eye Exam


Eye Exam: EOMI, Normal appearance, PERRL


Pupil Exam: NORMAL ACCOMODATION, PERRL





- ENT Exam


ENT Exam: Mucous Membranes Moist, Normal Exam





- Neck Exam


Neck Exam: Full ROM, Normal Inspection.  absent: Lymphadenopathy





- Respiratory Exam


Respiratory Exam: Clear to Ausculation Bilateral, NORMAL BREATHING PATTERN





- Cardiovascular Exam


Cardiovascular Exam: REGULAR RHYTHM, +S1, +S2.  absent: Murmur





- GI/Abdominal Exam


GI & Abdominal Exam: Soft, Normal Bowel Sounds.  absent: Tenderness





- Extremities Exam


Extremities Exam: Full ROM, Normal Capillary Refill, Normal Inspection.  absent

: Joint Swelling, Pedal Edema





- Back Exam


Back Exam: NORMAL INSPECTION





- Neurological Exam


Neurological Exam: Alert, Awake, CN II-XII Intact, Normal Gait, Oriented x3





- Psychiatric Exam


Psychiatric exam: Normal Affect, Normal Mood





- Skin


Skin Exam: Dry, Intact, Normal Color, Warm





Assessment and Plan





- Assessment and Plan (Free Text)


Assessment: 


Assessment


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 

amputation 2 months ago, grew Enterobacter


S/P Left 5th metatarsal osteomyelitis S/P debridement and bone excision grew 

Acinetobacter and E. faecalis


CAD S/P CABG


chronic CHF


DM


COPD


S/P AICD placement


S/P kidney transplant





Plan


continue Merrem for 4-6 weeks with weekly ESR, CRP, CBC, CMP with outpatient 

follow up with Podiatry


wound dressings and packing


recommendations per Dr. Rockwell English

## 2021-08-28 NOTE — ED PROVIDER NOTE - PATIENT PORTAL LINK FT
You can access the FollowMyHealth Patient Portal offered by [facility] by registering at the following website: [email or web address]. By joining [facility]’s FollowMyHealth portal, you will also be able to view your health information using other applications (apps) compatible with our system.

## 2021-08-28 NOTE — ED ADULT NURSE REASSESSMENT NOTE - NS ED NURSE REASSESS COMMENT FT1
Awake and alert, appears in no distress, medicated as ordered, NS in fusing well. Will continue to monitor,.

## 2021-08-28 NOTE — ED PROVIDER NOTE - PROGRESS NOTE DETAILS
pt feeling better, no n/v, asking to eat. will po challenge. pt ate, tolerated po. Sleeping now w potassium running, last dose. pt ate, tolerated po. Sleeping now w potassium running, last dose. sign out to night team

## 2021-08-28 NOTE — ED ADULT NURSE NOTE - OBJECTIVE STATEMENT
Break coverage- Pt received into spot #1. AAOx4, c/o epigastric pain acc with n/v started this morning. Abdomen soft, mildly tender upon palpation. Denies fever/chills. #20g IV placed to left ac, labs drawn and sent. Awaiting MD van.

## 2021-08-28 NOTE — ED ADULT TRIAGE NOTE - CHIEF COMPLAINT QUOTE
Arrives via [facility] with nausea and vomiting with epigastric pain.  Pt only compliant with Synthroid medication.  Unable to obtain oral or axillary temperature in triage

## 2021-08-29 VITALS
DIASTOLIC BLOOD PRESSURE: 71 MMHG | OXYGEN SATURATION: 100 % | RESPIRATION RATE: 16 BRPM | HEART RATE: 81 BPM | SYSTOLIC BLOOD PRESSURE: 135 MMHG

## 2021-09-07 ENCOUNTER — EMERGENCY (EMERGENCY)
Facility: HOSPITAL | Age: 81
LOS: 1 days | Discharge: HOME | End: 2021-09-07
Attending: EMERGENCY MEDICINE | Admitting: EMERGENCY MEDICINE
Payer: MEDICARE

## 2021-09-07 VITALS
TEMPERATURE: 98 F | OXYGEN SATURATION: 100 % | HEART RATE: 72 BPM | SYSTOLIC BLOOD PRESSURE: 126 MMHG | RESPIRATION RATE: 15 BRPM | DIASTOLIC BLOOD PRESSURE: 92 MMHG

## 2021-09-07 VITALS
HEART RATE: 79 BPM | OXYGEN SATURATION: 100 % | SYSTOLIC BLOOD PRESSURE: 153 MMHG | TEMPERATURE: 98 F | RESPIRATION RATE: 16 BRPM | DIASTOLIC BLOOD PRESSURE: 89 MMHG

## 2021-09-07 LAB
ALBUMIN SERPL ELPH-MCNC: 4.3 G/DL — SIGNIFICANT CHANGE UP (ref 3.3–5)
ALP SERPL-CCNC: 73 U/L — SIGNIFICANT CHANGE UP (ref 40–120)
ALT FLD-CCNC: 9 U/L — SIGNIFICANT CHANGE UP (ref 4–33)
ANION GAP SERPL CALC-SCNC: 12 MMOL/L — SIGNIFICANT CHANGE UP (ref 7–14)
APTT BLD: 33.1 SEC — SIGNIFICANT CHANGE UP (ref 27–36.3)
AST SERPL-CCNC: 13 U/L — SIGNIFICANT CHANGE UP (ref 4–32)
BASE EXCESS BLDV CALC-SCNC: 4.7 MMOL/L — HIGH (ref -2–3)
BASOPHILS # BLD AUTO: 0.03 K/UL — SIGNIFICANT CHANGE UP (ref 0–0.2)
BASOPHILS NFR BLD AUTO: 0.7 % — SIGNIFICANT CHANGE UP (ref 0–2)
BILIRUB SERPL-MCNC: 0.8 MG/DL — SIGNIFICANT CHANGE UP (ref 0.2–1.2)
BUN SERPL-MCNC: 9 MG/DL — SIGNIFICANT CHANGE UP (ref 7–23)
CALCIUM SERPL-MCNC: 9 MG/DL — SIGNIFICANT CHANGE UP (ref 8.4–10.5)
CHLORIDE SERPL-SCNC: 103 MMOL/L — SIGNIFICANT CHANGE UP (ref 98–107)
CO2 BLDV-SCNC: 33.8 MMOL/L — HIGH (ref 22–26)
CO2 SERPL-SCNC: 26 MMOL/L — SIGNIFICANT CHANGE UP (ref 22–31)
CREAT SERPL-MCNC: 0.64 MG/DL — SIGNIFICANT CHANGE UP (ref 0.5–1.3)
EOSINOPHIL # BLD AUTO: 0.1 K/UL — SIGNIFICANT CHANGE UP (ref 0–0.5)
EOSINOPHIL NFR BLD AUTO: 2.2 % — SIGNIFICANT CHANGE UP (ref 0–6)
GLUCOSE SERPL-MCNC: 116 MG/DL — HIGH (ref 70–99)
HCO3 BLDV-SCNC: 32 MMOL/L — HIGH (ref 22–29)
HCT VFR BLD CALC: 39.7 % — SIGNIFICANT CHANGE UP (ref 34.5–45)
HGB BLD-MCNC: 13.1 G/DL — SIGNIFICANT CHANGE UP (ref 11.5–15.5)
IANC: 2.37 K/UL — SIGNIFICANT CHANGE UP (ref 1.5–8.5)
IMM GRANULOCYTES NFR BLD AUTO: 0.2 % — SIGNIFICANT CHANGE UP (ref 0–1.5)
INR BLD: 1.23 RATIO — HIGH (ref 0.88–1.16)
LIDOCAIN IGE QN: 28 U/L — SIGNIFICANT CHANGE UP (ref 7–60)
LYMPHOCYTES # BLD AUTO: 1.63 K/UL — SIGNIFICANT CHANGE UP (ref 1–3.3)
LYMPHOCYTES # BLD AUTO: 36 % — SIGNIFICANT CHANGE UP (ref 13–44)
MCHC RBC-ENTMCNC: 29 PG — SIGNIFICANT CHANGE UP (ref 27–34)
MCHC RBC-ENTMCNC: 33 GM/DL — SIGNIFICANT CHANGE UP (ref 32–36)
MCV RBC AUTO: 88 FL — SIGNIFICANT CHANGE UP (ref 80–100)
MONOCYTES # BLD AUTO: 0.39 K/UL — SIGNIFICANT CHANGE UP (ref 0–0.9)
MONOCYTES NFR BLD AUTO: 8.6 % — SIGNIFICANT CHANGE UP (ref 2–14)
NEUTROPHILS # BLD AUTO: 2.37 K/UL — SIGNIFICANT CHANGE UP (ref 1.8–7.4)
NEUTROPHILS NFR BLD AUTO: 52.3 % — SIGNIFICANT CHANGE UP (ref 43–77)
NRBC # BLD: 0 /100 WBCS — SIGNIFICANT CHANGE UP
NRBC # FLD: 0 K/UL — SIGNIFICANT CHANGE UP
PCO2 BLDV: 58 MMHG — HIGH (ref 39–42)
PH BLDV: 7.35 — SIGNIFICANT CHANGE UP (ref 7.32–7.43)
PLATELET # BLD AUTO: 228 K/UL — SIGNIFICANT CHANGE UP (ref 150–400)
PO2 BLDV: 28 MMHG — SIGNIFICANT CHANGE UP
POTASSIUM SERPL-MCNC: 4 MMOL/L — SIGNIFICANT CHANGE UP (ref 3.5–5.3)
POTASSIUM SERPL-SCNC: 4 MMOL/L — SIGNIFICANT CHANGE UP (ref 3.5–5.3)
PROT SERPL-MCNC: 7.2 G/DL — SIGNIFICANT CHANGE UP (ref 6–8.3)
PROTHROM AB SERPL-ACNC: 14 SEC — HIGH (ref 10.6–13.6)
RBC # BLD: 4.51 M/UL — SIGNIFICANT CHANGE UP (ref 3.8–5.2)
RBC # FLD: 12.8 % — SIGNIFICANT CHANGE UP (ref 10.3–14.5)
SAO2 % BLDV: 38 % — SIGNIFICANT CHANGE UP
SODIUM SERPL-SCNC: 141 MMOL/L — SIGNIFICANT CHANGE UP (ref 135–145)
TROPONIN T, HIGH SENSITIVITY RESULT: <6 NG/L — SIGNIFICANT CHANGE UP
WBC # BLD: 4.53 K/UL — SIGNIFICANT CHANGE UP (ref 3.8–10.5)
WBC # FLD AUTO: 4.53 K/UL — SIGNIFICANT CHANGE UP (ref 3.8–10.5)

## 2021-09-07 PROCEDURE — 70496 CT ANGIOGRAPHY HEAD: CPT | Mod: 26

## 2021-09-07 PROCEDURE — 99281 EMR DPT VST MAYX REQ PHY/QHP: CPT

## 2021-09-07 PROCEDURE — 70498 CT ANGIOGRAPHY NECK: CPT | Mod: 26

## 2021-09-07 PROCEDURE — 0042T: CPT

## 2021-09-07 PROCEDURE — 99285 EMERGENCY DEPT VISIT HI MDM: CPT

## 2021-09-07 RX ORDER — SODIUM CHLORIDE 9 MG/ML
1000 INJECTION INTRAMUSCULAR; INTRAVENOUS; SUBCUTANEOUS ONCE
Refills: 0 | Status: COMPLETED | OUTPATIENT
Start: 2021-09-07 | End: 2021-09-07

## 2021-09-07 RX ORDER — MECLIZINE HCL 12.5 MG
25 TABLET ORAL ONCE
Refills: 0 | Status: COMPLETED | OUTPATIENT
Start: 2021-09-07 | End: 2021-09-07

## 2021-09-07 RX ADMIN — SODIUM CHLORIDE 1000 MILLILITER(S): 9 INJECTION INTRAMUSCULAR; INTRAVENOUS; SUBCUTANEOUS at 17:35

## 2021-09-07 RX ADMIN — Medication 25 MILLIGRAM(S): at 17:35

## 2021-09-07 NOTE — ED PROVIDER NOTE - PATIENT PORTAL LINK FT
You can access the FollowMyHealth Patient Portal offered by [facility] by registering at the following website: [email or web address]. By joining [facility]’s FollowMyHealth portal, you will also be able to view your health information using other applications (apps) compatible with our system.

## 2021-09-07 NOTE — CONSULT NOTE ADULT - ASSESSMENT
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     PLAN:   - OP f/u with Dr. Joon Miranda in 1 week     Case discussed with attending neurosurgeon.

## 2021-09-07 NOTE — CONSULT NOTE ADULT - ASSESSMENT
82yo R-handed woman with PMH of HTN, HLD, hypothyroidism, and vertigo presents to the ED with epigastric pain and dizziness described as lightheadedness. Patient reports LKN 10am with symptom onset 1200h. In the ED code stroke was activated. Physical exam remarkable for cautious non-ataxic gait, otherwise no focal deficit. NIHSS 0. Pre-MRS 0. CTH shows no hemorrhage or acute pathology. CTA head/neck shows no LVO. CTP was unremarkable. Patient was not a candidate for tPA since she is out of window and is not a candidate for thrombectomy due to no LVO.     Impression: Dizziness described as pre-syncope associated with nausea and epigastric pain, unlikely to be due to TIA or ischemic CVA. Possible orthostatic hypotension vs. peripheral etiology such as BPPV, r/o toxic-metabolic or GI etiology.    Recommendations:  [] No further inpatient neurological workup indicated  [] Orthostatic vital signs  [] CBC, BMP, B12, folate, TSH, UA  [] Rest of infectious/metabolic workup per ED  [] Symptomatic management with Zofran, IVF, and meclizine PRN  [] Follow-up with neurology attending Dr. Quintana at [address] 2 weeks after discharge for further management and care.    Case and disposition discussed with stroke fellow Dr. Syed.  Case to be discussed with neurology attending Dr. Quintana. 80yo R-handed woman with PMH of HTN, HLD, hypothyroidism, and vertigo presents to the ED with epigastric pain and dizziness described as lightheadedness. Patient reports LKN 10am with symptom onset 1200h. In the ED code stroke was activated. Physical exam remarkable for cautious non-ataxic gait, otherwise no focal deficit. NIHSS 0. Pre-MRS 0. Labs grossly WNL. CTH shows no hemorrhage or acute pathology. CTA head/neck shows no LVO. CTP was unremarkable. Patient was not a candidate for tPA since she is out of window and is not a candidate for thrombectomy due to no LVO.     Impression: Dizziness described as pre-syncope associated with nausea and epigastric pain, unlikely to be due to TIA or ischemic CVA. Possible orthostatic hypotension vs. peripheral etiology such as BPPV, r/o toxic-metabolic, cardiac, or GI etiology.    Recommendations:  [] No further inpatient neurological workup indicated  [] Orthostatic vital signs  [] CBC, BMP, B12, folate, TSH, UA  [] Rest of infectious/metabolic workup per ED  [] Symptomatic management with Zofran, IVF, and meclizine PRN  [] Follow-up with neurology attending Dr. Quintana at [address] 2-4 weeks after discharge for further management and care.    Case and disposition discussed with stroke fellow Dr. Syed.  Case and disposition discussed with neurology attending Dr. Quintana.

## 2021-09-07 NOTE — ED PROVIDER NOTE - PROGRESS NOTE DETAILS
pt feeling better. pending neurosurgery for ?small aneurysm.   Spoke with daughter, aware of results. pt walked without assistance. no n/v. trop neg. cleared by neurosurgery for outpt MRI. pt informed of plan  results. will dc home. provide list of neurology.

## 2021-09-07 NOTE — ED PROVIDER NOTE - NSFOLLOWUPINSTRUCTIONS_ED_ALL_ED_FT
Follow up with Neurosurgery for on outpatient MRI.   Follow-up with neurology attending Dr. Quintana at [address] 2-4 weeks after discharge for further management and care.      Vertigo    WHAT YOU NEED TO KNOW:    Vertigo is a condition that causes you to feel dizzy. You may feel that you or everything around you is moving or spinning. You may also feel like you are being pulled down or toward your side.    DISCHARGE INSTRUCTIONS:    Seek care immediately if:    You have a headache and a stiff neck.    You have shaking chills and a fever.    You vomit over and over with no relief.    You have blood, pus, or fluid coming out of your ears.    You are confused.  Contact your healthcare provider if:    Your symptoms do not get better with treatment.    You have questions about your condition or care.  Medicines:    Medicine may be given to help relieve your symptoms.    Take your medicine as directed. Contact your healthcare provider if you think your medicine is not helping or if you have side effects. Tell him or her if you are allergic to any medicine. Keep a list of the medicines, vitamins, and herbs you take. Include the amounts, and when and why you take them. Bring the list or the pill bottles to follow-up visits. Carry your medicine list with you in case of an emergency.  Manage your symptoms:    Do not drive, walk without help, or operate heavy machinery when you are dizzy.    Move slowly when you move from one position to another position. Get up slowly from sitting or lying down. Sit or lie down right away if you feel dizzy.    Drink plenty of liquids. Liquids help prevent dehydration. Ask how much liquid to drink each day and which liquids are best for you.    Vestibular and balance rehabilitation therapy (VBRT) is used to teach you exercises to improve your balance and strength. These exercises may help decrease your vertigo and improve your balance. Ask for more information about this therapy.  Follow up with your healthcare provider as directed: Write down your questions so you remember to ask them during your visits.    Vértigo    LO QUE NECESITA SABER:    El vértigo es isidra afección que hace que usted se sienta mareado. Es posible que tenga la sensación de que usted o todo a yung alrededor se está moviendo o dando vueltas. Usted también podría sentir nayan que lo empujan hacia el piso o hacia un lado.    INSTRUCCIONES SOBRE EL [facility] HOSPITALARIA:    Busque atención médica de inmediato si:    Usted tiene dolor de shailesh y rigidez en el joann.    Usted tiene escalofríos con temblores y fiebre.    Usted vomita isidra y otra vez sin alivio.    Le sale cristóbal, pus o líquido de los oídos.    Usted está confundido.  Comuníquese con yung médico si:    Bib síntomas no mejoran con el tratamiento.    Tiene alguna pregunta acerca de yung condición o cuidado.  Medicamentos:    Medicamentospodría administrase para aliviar bib síntomas.    [place] bib medicamentos nayan se le haya indicado.Consulte con yung médico si usted bina que yung medicamento no le está ayudando o si presenta efectos secundarios. Infórmele si es alérgico a cualquier medicamento. Mantenga isidra lista actualizada de los medicamentos, las vitaminas y los productos herbales que travon. Incluya los siguientes datos de los medicamentos: cantidad, frecuencia y motivo de administración. Traiga con usted la lista o los envases de las píldoras a bib citas de seguimiento. Lleve la lista de los medicamentos con usted en jamie de isidra emergencia.  El manejo de bib síntomas:    No manejeNo camine sin ayuda ni maneje maquinaria pesada cuando está mareado.    Muévase lentamentecuando pase de isidra posición a otra. Levántese lentamente después de ally estado sentado o acostado. Siéntese o acuéstese en seguida si se siente mareado.    [place] suficiente líquidos.Los líquidos ayudan a evitar la deshidratación. Pregunte cuánto líquido debe jorje cada día y cuáles líquidos son los más adecuados para usted.    Terapia de rehabilitación vestibular y del equilibrio (TRVE)se usa para enseñarle ejercicios para mejorar yung equilibrio y fuerza. Estos ejercicios pueden ayudarle a disminuir yung vértigo y a mejorar yung equilibrio. Pida más información sobre esta terapia.  Acuda a bib consultas de control con yung médico según le indicaron.Anote bib preguntas para que se acuerde de hacerlas tera bib visitas.

## 2021-09-07 NOTE — ED PROVIDER NOTE - OBJECTIVE STATEMENT
Dr Peralta  80yo F hx of HTN, HLD, hypothyroidism, hx vertigo  pw feeling dizzy since noon today. Feels like prior episode of vertigo. Took meclizine at 1130am, wo relief. Denies any change in vision. No difficulty speaking or swallowing.  +nausea no vomit. +epigastric "discomfort" non-radiating, non exertional, non pleuritic. no SOB. no cough no fever. no weakness or numbness. no fall. no trauma. Similar symptoms about a week ago.   Code stroke called in triage. Neurology at bedside.

## 2021-09-07 NOTE — ED ADULT TRIAGE NOTE - CHIEF COMPLAINT QUOTE
pt c/o dizziness, weakness beginning around 12 this afternoon. pt also c/o epigastric pain and numbness and tingling to the left hand. pt appears lethargic/weak. denies any blurry vision, facial droop. BG as noted. hx of HTN. Code stroke activated, pt taken directly to CT

## 2021-09-07 NOTE — CONSULT NOTE ADULT - SUBJECTIVE AND OBJECTIVE BOX
Neurology  Consult Note  09-07-21    Name:  CLAUDETTE OSIAS; 81y (1940)    HPI: 82yo R-handed woman with PMH of HTN, HLD, hypothyroidism, and vertigo presents to the ED with epigastric pain and dizziness. Patient reports LKN 10am with symptom onset 1200h. She describes dizziness as lightheadedness stating it's different from prior episodes of room-spinning sensation. Patient reports similar ED visit a week ago where she was having abdominal pain with dizziness. She takes meclizine PRN, synthroid, and occasionally anti-hypertensives. She denies taking aspirin or anticoagulation. Patient reports associated nausea and generalized weakness, but no vomiting, numbness, acute changes in vision, difficulty with speech or swallow, or recent fall.    In the ED code stroke was activated. NIHSS 0. Pre-MRS 0. CTH shows no hemorrhage or acute pathology. CTA head/neck shows no LVO. CTP was unremarkable. Patient was not a candidate for tPA since she is out of window and is not a candidate for thrombectomy due to no LVO.     Review of Systems:  As states in HPI.        PMHx:   Hypothyroid    PSuHx:   No significant past surgical history      Vitals:  T(C): 36.7 (09-07-21 @ 16:23), Max: 36.7 (09-07-21 @ 16:23)  HR: 85 (09-07-21 @ 17:38) (79 - 94)  BP: 142/86 (09-07-21 @ 17:38) (142/86 - 153/89)  RR: 17 (09-07-21 @ 17:38) (16 - 17)  SpO2: 100% (09-07-21 @ 17:38) (100% - 100%)    Physical Examination:  Neurologic:  - Mental Status: Alert, awake, oriented to person, place, and time; Speech is fluent with intact repetition and comprehension; Follows all commands including cross commands.  - Cranial Nerves:  II: Visual fields are full to confrontation; Pupils are equal, round, and reactive to light;  III, IV, VI: Extraocular movements are intact without nystagmus.  V: Facial sensation is intact in the V1-V3 distribution bilaterally.  VII: Face is symmetric with normal eye closure and smile.  VIII: Hearing is intact to finger rub.  IX, X: Uvula is midline and soft palate rises symmetrically.  XI: Head turning and shoulder shrug are intact.  XII: Tongue protrudes in the midline.  - Motor: Strength is 5/5 throughout. There is no pronator drift.  - Reflexes: 2+ and symmetric at the biceps, brachioradialis, knees.  - Sensory: Intact throughout to light touch. No extinction on double stimulation.  - Coordination: Finger-nose-finger and heel-knee-shin intact without dysmetria.  - Gait: Cautious but narrow normal steps, base, arm swing, and turning.    Labs:    CAPILLARY BLOOD GLUCOSE  POCT Blood Glucose.: 107 mg/dL (07 Sep 2021 16:28)    Rest of labs pending        Radiology:  CT Brain Stroke Protocol (09.07.21 @ 16:52)  IMPRESSION: No evidence of acute hematemesis of acute hemorrhage massor mass effect.    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 Neurology  Consult Note  09-07-21    Name:  CLAUDETTE OSIAS; 81y (1940)    HPI: 80yo R-handed woman with PMH of HTN, HLD, hypothyroidism, and vertigo presents to the ED with epigastric pain and dizziness. Patient reports LKN 10am with symptom onset 1200h. She describes dizziness as lightheadedness stating it's different from prior episodes of room-spinning sensation. Patient reports similar ED visit a week ago where she was having abdominal pain with dizziness. She takes meclizine PRN, synthroid, and occasionally anti-hypertensives. She denies taking aspirin or anticoagulation. Patient reports associated nausea and generalized weakness, but no vomiting, numbness, acute changes in vision, difficulty with speech or swallow, or recent fall.    In the ED code stroke was activated. NIHSS 0. Pre-MRS 0. CTH shows no hemorrhage or acute pathology. CTA head/neck shows no LVO. CTP was unremarkable. Patient was not a candidate for tPA since she is out of window and is not a candidate for thrombectomy due to no LVO.     Review of Systems:  As states in HPI.        PMHx:   Hypothyroid    PSuHx:   No significant past surgical history      Vitals:  T(C): 36.7 (09-07-21 @ 16:23), Max: 36.7 (09-07-21 @ 16:23)  HR: 85 (09-07-21 @ 17:38) (79 - 94)  BP: 142/86 (09-07-21 @ 17:38) (142/86 - 153/89)  RR: 17 (09-07-21 @ 17:38) (16 - 17)  SpO2: 100% (09-07-21 @ 17:38) (100% - 100%)    Physical Examination:  Neurologic:  - Mental Status: Alert, awake, oriented to person, place, and time; Speech is fluent with intact repetition and comprehension; Follows all commands including cross commands.  - Cranial Nerves:  II: Visual fields are full to confrontation; Pupils are equal, round, and reactive to light;  III, IV, VI: Extraocular movements are intact without nystagmus.  V: Facial sensation is intact in the V1-V3 distribution bilaterally.  VII: Face is symmetric with normal eye closure and smile.  VIII: Hearing is intact to finger rub.  IX, X: Uvula is midline and soft palate rises symmetrically.  XI: Head turning and shoulder shrug are intact.  XII: Tongue protrudes in the midline.  - Motor: Strength is 5/5 throughout. There is no pronator drift.  - Reflexes: 2+ and symmetric at the biceps, brachioradialis, knees.  - Sensory: Intact throughout to light touch. No extinction on double stimulation.  - Coordination: Finger-nose-finger and heel-knee-shin intact without dysmetria.  - Gait: Cautious but narrow normal steps, base, arm swing, and turning.    Labs:                        13.1   4.53  )-----------( 228      ( 07 Sep 2021 18:25 )             39.7       CAPILLARY BLOOD GLUCOSE  POCT Blood Glucose.: 107 mg/dL (07 Sep 2021 16:28)        Radiology:  CT Brain Stroke Protocol (09.07.21 @ 16:52)  IMPRESSION: No evidence of acute hematemesis of acute hemorrhage massor mass effect.    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

## 2021-09-07 NOTE — ED PROVIDER NOTE - CARE PROVIDER_API CALL
Joon Miranda (MD)  Neurosurgery  [address], [address]  [place] [address]  Phone: [phone]  Fax: [phone]  Follow Up Time: 4-6 Days

## 2021-09-07 NOTE — ED ADULT NURSE REASSESSMENT NOTE - NS ED NURSE REASSESS COMMENT FT1
Stroke team responded to Code Stroke in ED CT scan area.  IV access obtained.  Unable to obtain bloods from pt.  Endorsed to nurse in area.  Attending made aware of difficulty drawing blood.  Pt to receive IV fluids prior to next blood draw attempt.  Nurse in area aware of same.

## 2021-09-07 NOTE — ED ADULT NURSE NOTE - NSFALLRSKOUTCOME_ED_ALL_ED
"Blood and Marrow Transplant   New Transplant Visit with   Clinical     Assessment completed on 8/13/2021 via phone as part of the COVID 19 protocol. Assessment of living situation, support system, financial status, functional status, coping, stressors, need for resources and social work intervention provided as needed. Information for this assessment was provided by pt and pt's friend-Lori's report in addition to medical chart review and consultation with medical team.     Present:  Patient: Mil Seals  Friend: Jeannette \"Lori\" MarvinGene  : JEMIMA Avery, [facility]    Medical Team   Nurse Coordinator: Eunice Bae RN  BMT Physician: Yonathan Cullen MD    Presenting Information:  Pt is a 64 year old female diagnosed with Multiple Myeloma. Pt was diagnosed on 4/22/2021. Pt presents for autologous stem cell transplant discussion.    Contact Information:  Cell Phone: [phone].     Relocation Requirement:   Pt lives in [place], [place] (1+ hour away from [facility]). Pt will need to relocate and will need local lodging. SW discussed relocation and explained in detail the different lodging options. Pt is in agreement with relocating. Pt prefers to stay at the [facility].    Living Situation:   Pt lives at home alone.    Family Information:   Parents: Mother (Lives in [place], [place])  Siblings: Adopted brother and 1/2 brother.  Children: 4 Adult Children (all live in [place])  Friend: Jeannette \"Lori\" Nelson and Pam    Education/Employment:  Currently employed: Yes; [facility]  Employer: [employer]  Occupation: Peer Support    Insurance:   [employer] [place] (JobApp ID: [id number])    Pt has travel reimbursement through [employer] through [facility]. TYRONE spoke with -Sandra at [employer] (P: [phone]) and she said SW needs to call [facility] to get travel benefit information. Sandra said if the [facility] Main Line (P: [phone]) does not work, SW can try " [facility] Lodging Specialist-Ashleigh (P: [phone]) and see if Ashleigh still works for [employer]. TYRONE called [employer] (P: [phone]) and found the following information:     Lodging - They will cover 70% of the lodging stay and member is responsible for the other 30% of the lodging costs. Member has to pay in advance and MTM will reimburse.    Gas/Mileage - MTM will reimburse for gas and mileage but you have to complete the trip form attached to this email and send back to [facility].     Food - MTM will reimburse for food up to $10 a meal per person. They will reimburse for up to $10 per meal for both caregiver and patient.     *TYRONE asked if MTM can call and set up lodging and pay in advance as TYRONE explained pt does not have the funds to pay for lodging up front. MTM member said no. SW has a call into another [facility] Lodging Specialist-Ashleigh (P: [phone]) in [place] to see if there is a way they can pay upfront instead of reimbursing pt. TYRONE provided this update to pt and pt's friend-Lori via e-mail, per their request.     Mil's e-mail - [email or web address]  Lori's e-mail - [email or web address]    TYRONE provided information regarding the insurance authorization process and the role of the [employer] . TYRONE provided contact info for the [employer]  and referred pt to them for future insurance questions.     Finances:   No income; pt is currently paying her bills with an injury settlement she received. TYRONE encouraged pt to apply for SSDI; pt will apply through [facility]. TYRONE discussed qian options and asked pt to let SW know if they would like to apply in the future. Pt would like to apply for grants when she comes for transplant.    Caregiver:   TYRONE discussed with the pt and pt's friend-Lori the caregiver role and expectation at length. Pt is agreeable to having a full time caregiver for the minimum of 30 days until cleared by the BMT Physician. Pt's identified caregivers is her friend-Lori's wife-Pam.  Caregiver education and information provided. No caregiver concerns identified.     Healthcare Directive:  Yes. Pt has a health care directive and will bring it when they return to the BMT program.     Resources Provided:  -BMT Information Book  -BMT Resources Packet  -Caregiver Contract/Description  -Transplant Unit Description and Information   -Lodging Resources  -BMT Journey Book    Identified Concerns:  No concerns identified at this time.     Summary:  Pt presents to [facility] regarding an autologous stem cell transplant. Pt and pt's friend asked good/appropriate questions regarding psychosocial factors related to BMT; all questions were addressed. Pt presented as very pleasant and engaged. Pt's friend was very supportive. Pt will need to relocate and prefers the [facility]; pt has travel benefits through [facility] (see insurance section). No caregiver concerns; friend-Pam will be caregiver.    Plan:   SW provided contact information and encouraged pt to contact SW with any additional questions, concerns, resources and/or for support. SW will continue to follow pt to provide support and guidance with resources as needed.     JEMIMA Avery, [facility]  Phone: [phone]  Pager: [phone]       Fall Risk

## 2021-09-07 NOTE — ED PROVIDER NOTE - NSCAREINITIATED _GEN_ER
Solis Mcrae(Resident) Split-Thickness Skin Graft Text: The defect edges were debeveled with a #15 scalpel blade.  Given the location of the defect, shape of the defect and the proximity to free margins a split thickness skin graft was deemed most appropriate.  Using a sterile surgical marker, the primary defect shape was transferred to the donor site. The split thickness graft was then harvested.  The skin graft was then placed in the primary defect and oriented appropriately.

## 2021-09-07 NOTE — CONSULT NOTE ADULT - SUBJECTIVE AND OBJECTIVE BOX
NEUROSURGERY CONSULT    HPI: 80yo R-handed woman with PMH of HTN, HLD, hypothyroidism, and vertigo presents to the ED with epigastric pain and dizziness. Patient reports LKN 10am with symptom onset 1200h. She describes dizziness as lightheadedness stating it's different from prior episodes of room-spinning sensation. Patient reports similar ED visit a week ago where she was having abdominal pain with dizziness. She takes meclizine PRN, synthroid, and occasionally anti-hypertensives. She denies taking aspirin or anticoagulation. Patient reports associated nausea and generalized weakness, but no vomiting, numbness, acute changes in vision, difficulty with speech or swallow, or recent fall.    In the ED code stroke was activated. NIHSS 0. Pre-MRS 0. CTH shows no hemorrhage or acute pathology. CTA head/neck shows no LVO. CTP was unremarkable. Patient was not a candidate for tPA since she is out of window and is not a candidate for thrombectomy due to no LVO.     RADIOLOGY:     < from: CT Angio Neck w/ IV Cont (09.07.21 @ 17:01) >  CBF<30%: 0.0 mL  T-Max> 6.0S: 0.0 mL  Mismatch volume: 0.0 mL  Mismatch ratio: None    Both distal common carotid, proximal internal and external carotid arteries appear unremarkable without significant stenosis seen.    Both vertebral arteries. Normal swallow.    Evaluation of both distal internal carotid arteries appear normal. There is a small focus of abnormal flow related signal seen involving the right A1/A2 junction region. This does appear to project posteriorly and could compatible with a small aneurysm versus dysplastic vessel. This finding measures approximately 1.8 mm.    Both middle cerebral basilar and posterior cerebral arteries appear normal.    The dural venous sinuses appear normal    Evaluation of the soft tissue neck region demonstrates metallic densities in the left neck region which could be compatible postoperative changes. Please correlate clinically.    The visualized portions of both lung apices appear clear.    IMPRESSION: CT angiography of the neck appears normal    Small aneurysm versus dysplastic vessel seen involving the A1/A2 junction      PHYSICAL EXAM:  Vital Signs Last 24 Hrs  T(C): 36.7 (07 Sep 2021 21:04), Max: 36.7 (07 Sep 2021 16:23)  T(F): 98 (07 Sep 2021 21:04), Max: 98 (07 Sep 2021 16:23)  HR: 72 (07 Sep 2021 21:04) (72 - 94)  BP: 126/92 (07 Sep 2021 21:04) (122/77 - 153/89)  BP(mean): 102 (07 Sep 2021 17:15) (102 - 102)  RR: 15 (07 Sep 2021 21:04) (12 - 17)  SpO2: 100% (07 Sep 2021 21:04) (100% - 100%)    AOx3, appropriate, follows commands  PERRL, EOMI, face symmetrical, speech clear    JONES x 4 with good strength   Sensation intact to light touch   No pronator drift   No clonus  No hoffmans    LABS:                        13.1   4.53  )-----------( 228      ( 07 Sep 2021 18:25 )             39.7     09-07    141  |  103  |  9   ----------------------------<  116<H>  4.0   |  26  |  0.64    Ca    9.0      07 Sep 2021 18:23    TPro  7.2  /  Alb  4.3  /  TBili  0.8  /  DBili  x   /  AST  13  /  ALT  9   /  AlkPhos  73  09-07    PT/INR - ( 07 Sep 2021 18:25 )   PT: 14.0 sec;   INR: 1.23 ratio         PTT - ( 07 Sep 2021 18:25 )  PTT:33.1 sec

## 2021-09-07 NOTE — ED ADULT NURSE REASSESSMENT NOTE - NS ED NURSE REASSESS COMMENT FT1
Patient A&OX4, denies any dizziness chest pain, headache, or changes in vision currently. Patient denies any numbness or tingling. Patient ambulated to bathroom with steady gait. VS as noted. Awaiting CT read. Patient A&OX4, denies any dizziness chest pain, headache, or changes in vision currently. Patient denies any numbness or tingling. Patient ambulated to bathroom with steady gait. VS as noted. Awaiting disposition.

## 2021-09-07 NOTE — ED ADULT NURSE NOTE - OBJECTIVE STATEMENT
Break coverage RN- Received pt in room 1. pt A&OX3. pt with hx of hypothyroid. pt c/o dizziness, weakness beginning around 12 this afternoon. pt also c/o epigastric pain and numbness and tingling to the left hand. pt appears to be in no distress at this time. vitals stable as noted. airway patent, respirations are equal and nonlabored, no respiratory distress noted, sating 100% on room air. NSR on monitor. Neuro exam as noted. pt denies any pain at this time sob, fever/chills, cough, headache, n/v. 18 gauge iv placed in the right ac by stroke RN. unable to obtain labs at this time. iv fluids infusing. pt medicated as per orders, side rails up. awaiting further plan. will reassess and monitor.

## 2021-09-07 NOTE — ED PROVIDER NOTE - PHYSICAL EXAMINATION
Dr Peralta  laying in bed, AO3. no facial asymmetry. no slurred speech. EOMI.   supple neck  normal S1-S2  No resp distress. able to speak in full and clear sentences. no wheeze, rales or stridor.  AOx3, no focal deficits. CN 2-12 grossly intact. nl gait. no meningeal signs. neg pronator drift.  nl sensation of all ext.  soft nondistended, epigastric tenderness, no rebound or guarding. no cvat. neg Melo's sign.   no pedal edema. no calf tenderness. normal pulses bilateral feet.

## 2021-09-07 NOTE — ED ADULT NURSE NOTE - TEMPLATE LIST FOR HEAD TO TOE ASSESSMENT
Neuro Alert and oriented to person, place, time/situation. normal mood and affect. no apparent risk to self or others.

## 2021-11-05 ENCOUNTER — APPOINTMENT (OUTPATIENT)
Dept: NEUROLOGY | Facility: CLINIC | Age: 81
End: 2021-11-05
Payer: MEDICARE

## 2021-11-05 VITALS
HEIGHT: 62 IN | HEART RATE: 78 BPM | DIASTOLIC BLOOD PRESSURE: 74 MMHG | BODY MASS INDEX: 25.21 KG/M2 | SYSTOLIC BLOOD PRESSURE: 121 MMHG | WEIGHT: 137 LBS

## 2021-11-05 DIAGNOSIS — H81.09 MENIERE'S DISEASE, UNSPECIFIED EAR: ICD-10-CM

## 2021-11-05 PROCEDURE — 99204 OFFICE O/P NEW MOD 45 MIN: CPT

## 2021-11-05 NOTE — CONSULT LETTER
[Dear  ___] : Dear  [unfilled], [Consult Letter:] : I had the pleasure of evaluating your patient, [unfilled]. [Please see my note below.] : Please see my note below. [Consult Closing:] : Thank you very much for allowing me to participate in the care of this patient.  If you have any questions, please do not hesitate to contact me. [Sincerely,] : Sincerely, [FreeTextEntry2] : Delilah Cantu MD\par [address] [facility]\[place] [address]

## 2021-11-05 NOTE — PHYSICAL EXAM
[FreeTextEntry1] : No cognitive deficits.  She has high-frequency hearing loss in the right ear.  Winnebago-Hallpike maneuver negative.  Fukuda step test negative.  Tympanic membranes intact.  Visual fields are full to confrontation.  Pupils equal and constrict to light.  Extraocular movements intact, no nystagmus.  Neck is supple.  No bruits heard.  No focal weakness or sensory loss.  Tendon reflexes all active and symmetric.  Gait and coordination intact.

## 2021-11-05 NOTE — HISTORY OF PRESENT ILLNESS
[FreeTextEntry1] : 81-year-old woman went to the [facility] emergency room 2 months ago complaining of vertigo.  A stroke code was called.  Brain imaging was normal including CT angiogram of the head and neck.  She was advised to follow-up with neurologist.  She has a history of right-sided hearing loss right-sided tinnitus and episodes of vertigo.

## 2021-11-05 NOTE — ASSESSMENT
[FreeTextEntry1] : Advise follow-up with her otolaryngologist.  Consider vestibular therapy if symptoms progress.

## 2021-12-08 NOTE — ED ADULT TRIAGE NOTE - CADM TRG TX PRIOR TO ARRIVAL
From: Corinne R Palmer  To: Dileep Milligan  Sent: 12/8/2021 8:54 AM CST  Subject: Covid-19 symptoms    Hello,    I have some covid symptoms and trying to figure out where I can get tested with the fastest turn-around on results. Is urgent care in [place] my best option? It looks like [facility] can take a few days. Thank you!   none

## 2021-12-10 NOTE — STROKE CODE NOTE - NSSTROKEABCD2AGE_GEN_A_CORE
PA request for Rybelsus     PA processed and submitted to pt insurance, waiting for response in regards to coverage Yes

## 2022-01-12 ENCOUNTER — RESULT REVIEW (OUTPATIENT)
Age: 82
End: 2022-01-12

## 2022-01-13 ENCOUNTER — APPOINTMENT (OUTPATIENT)
Dept: OTOLARYNGOLOGY | Facility: CLINIC | Age: 82
End: 2022-01-13
Payer: MEDICARE

## 2022-01-13 VITALS
DIASTOLIC BLOOD PRESSURE: 78 MMHG | BODY MASS INDEX: 24.84 KG/M2 | TEMPERATURE: 97.7 F | SYSTOLIC BLOOD PRESSURE: 149 MMHG | HEIGHT: 62 IN | WEIGHT: 135 LBS | HEART RATE: 85 BPM

## 2022-01-13 DIAGNOSIS — R09.82 POSTNASAL DRIP: ICD-10-CM

## 2022-01-13 DIAGNOSIS — R07.0 PAIN IN THROAT: ICD-10-CM

## 2022-01-13 PROCEDURE — 99214 OFFICE O/P EST MOD 30 MIN: CPT | Mod: 25

## 2022-01-13 PROCEDURE — 31575 DIAGNOSTIC LARYNGOSCOPY: CPT

## 2022-01-13 RX ORDER — FLUTICASONE PROPIONATE 50 UG/1
50 SPRAY, METERED NASAL
Qty: 1 | Refills: 5 | Status: ACTIVE | COMMUNITY
Start: 2022-01-13 | End: 1900-01-01

## 2022-01-13 NOTE — HISTORY OF PRESENT ILLNESS
[de-identified] : Patient complains that when she swallows there is something in her throat x 3 weeks.  Only feels it when she is drinking or swallowing saliva gets radiating pain to her right ear.  Doesn't happen when she eats. She was treated with abx by her PCP 3 weeks ago, no cultures were taken. Its not painful to swallow. Denies fever, dysphonia or dysphagia.

## 2022-01-13 NOTE — END OF VISIT
[FreeTextEntry3] : I personally saw and examined [ ] in detail. I have spoken to Raya REYNA regarding the assessment and plan of care. I reviewed the above assessment and plan of care, and agree. I have made changes in the body of the note where appropriate.\par

## 2022-01-22 ENCOUNTER — TRANSCRIPTION ENCOUNTER (OUTPATIENT)
Age: 82
End: 2022-01-22

## 2022-04-12 NOTE — ED PROVIDER NOTE - NSTIMEPROVIDERCAREINITIATE_GEN_ER
[address] Internal Medicine  Jono Oneill MD; Alexandra Cordova MD; Harper May MD; MD Edita Belcher MD; MD PAULY Hardy [facility] Internal Medicine   [facility]    Date:   4/12/2022  Patient name:  Barbie Teresa  Date of admission:  4/9/2022  1:39 PM  MRN:   [id number]  Account:  [de-identified]  YOB: 1989  PCP:    No primary care provider on file. Room:   [address] [place] [address]  Code Status:    Full Code    Chief Complaint:     Chief Complaint   Patient presents with    Suicidal     plan on overdose meth/heroin or hang self   Street drug abuse intravenous drugs smoker    History Obtained From:     Patient medical record nursing staff    History of Present Illness:     Barbie Teresa is a 29 y.o. Unavailable / unknown male who presents with Suicidal (plan on overdose meth/heroin or hang self)   and is admitted to the hospital for the management of Major depressive disorder, recurrent, severe with psychotic features ([facility] [address] 74-year-old gentleman who is a smoker half pack a day history of intravenous drug abuse for many years has been in and out of the detention for last 10 years with active drug use multiple needle track marks noted on the arms patient claims to be on methadone 150 mg daily denies any fever chills no history of endocarditis no history of osteomyelitis    Past Medical History:     Past Medical History:   Diagnosis Date    Bipolar disorder ([facility] [address]     Depression     Substance abuse ([facility] [address]         Past Surgical History:     History reviewed. No pertinent surgical history. Medications Prior to Admission:     Prior to Admission medications    Medication Sig Start Date End Date Taking? Authorizing Provider   methadone 10 MG/5ML solution Take 150 mg by mouth every 24 hours.    Yes Historical Provider, MD   OLANZapine (ZYPREXA) 15 MG tablet Take 1 tablet by mouth nightly 12/20/21   Nikki Canales MD   cloNIDine (CATAPRES) 0.1 MG tablet Take 1 tablet by mouth 3 times daily as needed for Other (Withdrawal)  Patient not taking: Reported on 21   Barrington Benitez MD        Allergies:     Patient has no known allergies. Social History:     Tobacco:    reports that he has been smoking. His smokeless tobacco use includes chew. Alcohol:      reports current alcohol use. Drug Use:  reports current drug use. Drugs: IV and Marijuana ([employer]). Family History:     Family History   Problem Relation Age of Onset    Depression Other         uncle, commited suicide.  Alcohol Abuse Other         uncle    Alcohol Abuse Father        Review of Systems:     Positive and Negative as described in HPI. CONSTITUTIONAL:  negative for fevers, chills, sweats, fatigue, weight loss  HEENT:  negative for vision, hearing changes, runny nose, throat pain  RESPIRATORY:  negative for shortness of breath, cough, congestion, wheezing  CARDIOVASCULAR:  negative for chest pain, palpitations  GASTROINTESTINAL:  negative for nausea, vomiting, diarrhea, constipation, change in bowel habits, abdominal pain   GENITOURINARY:  negative for difficulty of urination, burning with urination, frequency   INTEGUMENT:  negative for rash, skin lesions, easy bruising   HEMATOLOGIC/LYMPHATIC:  negative for swelling/edema   ALLERGIC/IMMUNOLOGIC:  negative for urticaria , itching  ENDOCRINE:  negative increase in drinking, increase in urination, hot or cold intolerance  MUSCULOSKELETAL:  negative joint pains, muscle aches, swelling of joints  NEUROLOGICAL:  negative for headaches, dizziness, lightheadedness, numbness, pain, tingling extremities      Physical Exam:   /76   Pulse 66   Temp 97.5 °F (36.4 °C) (Oral)   Resp 14   Ht 6' (1.829 m)   Wt 200 lb (90.7 kg)   SpO2 100%   BMI 27.12 kg/m²   Temp (24hrs), Av.5 °F (36.4 °C), Min:97.4 °F (36.3 °C), Max:97.5 °F (36.4 °C)    No results for input(s): POCGLU in the last 72 hours.   No intake or output data in the 24 hours ending 04/12/22 1557    General Appearance: alert, well appearing, and in no acute distress  Mental status: oriented to person, place, and time  Head: normocephalic, atraumatic  Eye: no icterus, redness, pupils equal and reactive, extraocular eye movements intact, conjunctiva clear  Ear: normal external ear, no discharge, hearing intact  Nose: no drainage noted  Mouth: mucous membranes moist  Neck: supple, no carotid bruits, thyroid not palpable  Lungs: Bilateral equal air entry, clear to ausculation, no wheezing, rales or rhonchi, normal effort  Cardiovascular: normal rate, regular rhythm, no murmur, gallop, rub  Abdomen: Soft, nontender, nondistended, normal bowel sounds, no hepatomegaly or splenomegaly  Neurologic: There are no new focal motor or sensory deficits, normal muscle tone and bulk, no abnormal sensation, normal speech, cranial nerves II through XII grossly intact  Skin: No gross lesions, rashes, bruising or bleeding on exposed skin area  Extremities: peripheral pulses palpable, no pedal edema or calf pain with palpation  Multiple needle track marks noted on the arms from intravenous drug abuse    Investigations:      Laboratory Testing:  Recent Results (from the past 24 hour(s))   CBC with Auto Differential    Collection Time: 04/12/22  1:00 PM   Result Value Ref Range    WBC 7.8 3.5 - 11.0 k/uL    RBC 5.19 4.5 - 5.9 m/uL    Hemoglobin 15.1 13.5 - 17.5 g/dL    Hematocrit 43.6 41 - 53 %    MCV 84.0 80 - 100 fL    MCH 29.1 26 - 34 pg    MCHC 34.7 31 - 37 g/dL    RDW 13.6 11.5 - 14.9 %    Platelets 653 632 - 557 k/uL    MPV 7.1 6.0 - 12.0 fL    Seg Neutrophils 54 36 - 66 %    Lymphocytes 35 24 - 44 %    Monocytes 7 1 - 7 %    Eosinophils % 3 0 - 4 %    Basophils 1 0 - 2 %    Segs Absolute 4.20 1.3 - 9.1 k/uL    Absolute Lymph # 2.70 1.0 - 4.8 k/uL    Absolute Mono # 0.60 0.1 - 1.3 k/uL    Absolute Eos # 0.20 0.0 - 0.4 k/uL    Basophils Absolute 0.10 0.0 - 0.2 k/uL Imaging/Diagnostics:  No results found. Assessment :      Hospital Problems           Last Modified POA    * (Principal) Major depressive disorder, recurrent, severe with psychotic features ([facility] [address] 4/10/2022 Yes    PTSD (post-traumatic stress disorder) 4/10/2022 Yes    Methamphetamine abuse ([facility] [address] 4/10/2022 Yes    Opioid abuse, daily use ([facility] [address] 4/10/2022 Yes          Plan:     31-year-old gentleman who is a smoker half pack a day history of intravenous drug abuse for many years has been in and out of the alf for last 10 years with active drug use multiple needle track marks noted on the arms patient claims to be on methadone 150 mg daily denies any fever chills no history of endocarditis no history of osteomyelitis  Will order CBC and CRP if elevated medially echocardiogram    4/11/22    · Labs ok vitals stable 1.           4/12/22    Vitals:    04/10/22 1939 04/11/22 0800 04/11/22 2000 04/12/22 0730   BP: (!) 115/53 (!) 140/90 120/70 135/76   Pulse: 56 66 77 66   Resp: 14 14 14 14   Temp: 97.6 °F (36.4 °C) 97.3 °F (36.3 °C) 97.4 °F (36.3 °C) 97.5 °F (36.4 °C)   TempSrc: Tympanic  Oral Oral   SpO2:  100%     Weight:       Height:     ·       Medications:      Allergies:  No Known Allergies    Current Meds:   Scheduled Meds:    methadone  150 mg Oral Daily    OLANZapine  5 mg Oral Nightly     Continuous Infusions:   · PRN Meds: ondansetron, cloNIDine, dicyclomine, cyclobenzaprine, acetaminophen, ibuprofen, aluminum & magnesium hydroxide-simethicone, hydrOXYzine, polyethylene glycol, traZODone, nicotine polacrilex  ·      URINE ANALYSIS: No results found for: LABURIN     CBC:  Lab Results   Component Value Date    WBC 7.8 04/12/2022    HGB 15.1 04/12/2022     04/12/2022        BMP:    Lab Results   Component Value Date     04/09/2022    K 4.1 04/09/2022     04/09/2022    CO2 23 04/09/2022    BUN 7 04/09/2022    CREATININE 0.81 04/09/2022    GLUCOSE 106 04/09/2022      LIVER PROFILE:  Lab Results   Component Value Date    ALT 24 04/09/2022    AST 19 04/09/2022    PROT 7.4 04/09/2022    BILITOT 0.23 04/09/2022    LABALBU 4.3 04/09/2022      2.     3.      Medical ststus stable   Will sign off . Thanks . Please call again , if neeeded . Darrell Do MD  4/12/2022  3:57 PM    Copy sent to Dr. Jacob Saravia primary care provider on file. Please note that this chart was generated using voice recognition Dragon dictation software. Although every effort was made to ensure the accuracy of this automated transcription, some errors in transcription may have occurred. 23-Nov-2019 18:06

## 2022-11-12 ENCOUNTER — EMERGENCY (EMERGENCY)
Facility: HOSPITAL | Age: 82
LOS: 1 days | Discharge: HOME | End: 2022-11-12
Attending: EMERGENCY MEDICINE | Admitting: EMERGENCY MEDICINE

## 2022-11-12 VITALS
RESPIRATION RATE: 16 BRPM | OXYGEN SATURATION: 100 % | HEART RATE: 95 BPM | DIASTOLIC BLOOD PRESSURE: 87 MMHG | TEMPERATURE: 98 F | SYSTOLIC BLOOD PRESSURE: 150 MMHG

## 2022-11-12 LAB
ALBUMIN SERPL ELPH-MCNC: 4.5 G/DL — SIGNIFICANT CHANGE UP (ref 3.3–5)
ALP SERPL-CCNC: 87 U/L — SIGNIFICANT CHANGE UP (ref 40–120)
ALT FLD-CCNC: 16 U/L — SIGNIFICANT CHANGE UP (ref 4–33)
ANION GAP SERPL CALC-SCNC: 15 MMOL/L — HIGH (ref 7–14)
APPEARANCE UR: CLEAR — SIGNIFICANT CHANGE UP
APTT BLD: 28.1 SEC — SIGNIFICANT CHANGE UP (ref 27–36.3)
AST SERPL-CCNC: 17 U/L — SIGNIFICANT CHANGE UP (ref 4–32)
B PERT DNA SPEC QL NAA+PROBE: SIGNIFICANT CHANGE UP
B PERT+PARAPERT DNA PNL SPEC NAA+PROBE: SIGNIFICANT CHANGE UP
BASE EXCESS BLDV CALC-SCNC: 2.8 MMOL/L — SIGNIFICANT CHANGE UP (ref -2–3)
BASOPHILS # BLD AUTO: 0.02 K/UL — SIGNIFICANT CHANGE UP (ref 0–0.2)
BASOPHILS NFR BLD AUTO: 0.3 % — SIGNIFICANT CHANGE UP (ref 0–2)
BILIRUB SERPL-MCNC: 1 MG/DL — SIGNIFICANT CHANGE UP (ref 0.2–1.2)
BILIRUB UR-MCNC: NEGATIVE — SIGNIFICANT CHANGE UP
BLOOD GAS VENOUS COMPREHENSIVE RESULT: SIGNIFICANT CHANGE UP
BORDETELLA PARAPERTUSSIS (RAPRVP): SIGNIFICANT CHANGE UP
BUN SERPL-MCNC: 10 MG/DL — SIGNIFICANT CHANGE UP (ref 7–23)
C PNEUM DNA SPEC QL NAA+PROBE: SIGNIFICANT CHANGE UP
CALCIUM SERPL-MCNC: 9.8 MG/DL — SIGNIFICANT CHANGE UP (ref 8.4–10.5)
CHLORIDE BLDV-SCNC: 104 MMOL/L — SIGNIFICANT CHANGE UP (ref 96–108)
CHLORIDE SERPL-SCNC: 102 MMOL/L — SIGNIFICANT CHANGE UP (ref 98–107)
CO2 BLDV-SCNC: 28.4 MMOL/L — HIGH (ref 22–26)
CO2 SERPL-SCNC: 25 MMOL/L — SIGNIFICANT CHANGE UP (ref 22–31)
COLOR SPEC: SIGNIFICANT CHANGE UP
CREAT SERPL-MCNC: 0.64 MG/DL — SIGNIFICANT CHANGE UP (ref 0.5–1.3)
DIFF PNL FLD: NEGATIVE — SIGNIFICANT CHANGE UP
EGFR: 89 ML/MIN/1.73M2 — SIGNIFICANT CHANGE UP
EOSINOPHIL # BLD AUTO: 0.01 K/UL — SIGNIFICANT CHANGE UP (ref 0–0.5)
EOSINOPHIL NFR BLD AUTO: 0.1 % — SIGNIFICANT CHANGE UP (ref 0–6)
FLUAV SUBTYP SPEC NAA+PROBE: SIGNIFICANT CHANGE UP
FLUBV RNA SPEC QL NAA+PROBE: SIGNIFICANT CHANGE UP
GAS PNL BLDV: 137 MMOL/L — SIGNIFICANT CHANGE UP (ref 136–145)
GAS PNL BLDV: SIGNIFICANT CHANGE UP
GLUCOSE BLDV-MCNC: 130 MG/DL — HIGH (ref 70–99)
GLUCOSE SERPL-MCNC: 139 MG/DL — HIGH (ref 70–99)
GLUCOSE UR QL: NEGATIVE — SIGNIFICANT CHANGE UP
HADV DNA SPEC QL NAA+PROBE: SIGNIFICANT CHANGE UP
HCO3 BLDV-SCNC: 27 MMOL/L — SIGNIFICANT CHANGE UP (ref 22–29)
HCOV 229E RNA SPEC QL NAA+PROBE: SIGNIFICANT CHANGE UP
HCOV HKU1 RNA SPEC QL NAA+PROBE: SIGNIFICANT CHANGE UP
HCOV NL63 RNA SPEC QL NAA+PROBE: SIGNIFICANT CHANGE UP
HCOV OC43 RNA SPEC QL NAA+PROBE: SIGNIFICANT CHANGE UP
HCT VFR BLD CALC: 43.1 % — SIGNIFICANT CHANGE UP (ref 34.5–45)
HCT VFR BLDA CALC: 41 % — SIGNIFICANT CHANGE UP (ref 34.5–46.5)
HGB BLD CALC-MCNC: 13.5 G/DL — SIGNIFICANT CHANGE UP (ref 11.5–15.5)
HGB BLD-MCNC: 14 G/DL — SIGNIFICANT CHANGE UP (ref 11.5–15.5)
HMPV RNA SPEC QL NAA+PROBE: SIGNIFICANT CHANGE UP
HPIV1 RNA SPEC QL NAA+PROBE: SIGNIFICANT CHANGE UP
HPIV2 RNA SPEC QL NAA+PROBE: SIGNIFICANT CHANGE UP
HPIV3 RNA SPEC QL NAA+PROBE: SIGNIFICANT CHANGE UP
HPIV4 RNA SPEC QL NAA+PROBE: SIGNIFICANT CHANGE UP
IANC: 5.84 K/UL — SIGNIFICANT CHANGE UP (ref 1.8–7.4)
IMM GRANULOCYTES NFR BLD AUTO: 0.3 % — SIGNIFICANT CHANGE UP (ref 0–0.9)
INR BLD: 1.14 RATIO — SIGNIFICANT CHANGE UP (ref 0.88–1.16)
KETONES UR-MCNC: NEGATIVE — SIGNIFICANT CHANGE UP
LACTATE BLDV-MCNC: 2.7 MMOL/L — HIGH (ref 0.5–2)
LEUKOCYTE ESTERASE UR-ACNC: NEGATIVE — SIGNIFICANT CHANGE UP
LIDOCAIN IGE QN: 32 U/L — SIGNIFICANT CHANGE UP (ref 7–60)
LYMPHOCYTES # BLD AUTO: 1.12 K/UL — SIGNIFICANT CHANGE UP (ref 1–3.3)
LYMPHOCYTES # BLD AUTO: 15.3 % — SIGNIFICANT CHANGE UP (ref 13–44)
M PNEUMO DNA SPEC QL NAA+PROBE: SIGNIFICANT CHANGE UP
MAGNESIUM SERPL-MCNC: 2 MG/DL — SIGNIFICANT CHANGE UP (ref 1.6–2.6)
MCHC RBC-ENTMCNC: 28.5 PG — SIGNIFICANT CHANGE UP (ref 27–34)
MCHC RBC-ENTMCNC: 32.5 GM/DL — SIGNIFICANT CHANGE UP (ref 32–36)
MCV RBC AUTO: 87.8 FL — SIGNIFICANT CHANGE UP (ref 80–100)
MONOCYTES # BLD AUTO: 0.31 K/UL — SIGNIFICANT CHANGE UP (ref 0–0.9)
MONOCYTES NFR BLD AUTO: 4.2 % — SIGNIFICANT CHANGE UP (ref 2–14)
NEUTROPHILS # BLD AUTO: 5.84 K/UL — SIGNIFICANT CHANGE UP (ref 1.8–7.4)
NEUTROPHILS NFR BLD AUTO: 79.8 % — HIGH (ref 43–77)
NITRITE UR-MCNC: NEGATIVE — SIGNIFICANT CHANGE UP
NRBC # BLD: 0 /100 WBCS — SIGNIFICANT CHANGE UP (ref 0–0)
NRBC # FLD: 0 K/UL — SIGNIFICANT CHANGE UP (ref 0–0)
NT-PROBNP SERPL-SCNC: 13 PG/ML — SIGNIFICANT CHANGE UP
PCO2 BLDV: 40 MMHG — SIGNIFICANT CHANGE UP (ref 39–42)
PH BLDV: 7.44 — HIGH (ref 7.32–7.43)
PH UR: 8 — SIGNIFICANT CHANGE UP (ref 5–8)
PLATELET # BLD AUTO: 268 K/UL — SIGNIFICANT CHANGE UP (ref 150–400)
PO2 BLDV: 28 MMHG — SIGNIFICANT CHANGE UP
POTASSIUM BLDV-SCNC: 3.5 MMOL/L — SIGNIFICANT CHANGE UP (ref 3.5–5.1)
POTASSIUM SERPL-MCNC: 3.9 MMOL/L — SIGNIFICANT CHANGE UP (ref 3.5–5.3)
POTASSIUM SERPL-SCNC: 3.9 MMOL/L — SIGNIFICANT CHANGE UP (ref 3.5–5.3)
PROT SERPL-MCNC: 7.8 G/DL — SIGNIFICANT CHANGE UP (ref 6–8.3)
PROT UR-MCNC: NEGATIVE — SIGNIFICANT CHANGE UP
PROTHROM AB SERPL-ACNC: 13.2 SEC — SIGNIFICANT CHANGE UP (ref 10.5–13.4)
RAPID RVP RESULT: SIGNIFICANT CHANGE UP
RBC # BLD: 4.91 M/UL — SIGNIFICANT CHANGE UP (ref 3.8–5.2)
RBC # FLD: 13.3 % — SIGNIFICANT CHANGE UP (ref 10.3–14.5)
RBC CASTS # UR COMP ASSIST: SIGNIFICANT CHANGE UP /HPF (ref 0–4)
RSV RNA SPEC QL NAA+PROBE: SIGNIFICANT CHANGE UP
RV+EV RNA SPEC QL NAA+PROBE: SIGNIFICANT CHANGE UP
SAO2 % BLDV: 39.1 % — SIGNIFICANT CHANGE UP
SARS-COV-2 RNA SPEC QL NAA+PROBE: SIGNIFICANT CHANGE UP
SODIUM SERPL-SCNC: 142 MMOL/L — SIGNIFICANT CHANGE UP (ref 135–145)
SP GR SPEC: 1.01 — SIGNIFICANT CHANGE UP (ref 1.01–1.05)
TROPONIN T, HIGH SENSITIVITY RESULT: 6 NG/L — SIGNIFICANT CHANGE UP
TROPONIN T, HIGH SENSITIVITY RESULT: 6 NG/L — SIGNIFICANT CHANGE UP
UROBILINOGEN FLD QL: SIGNIFICANT CHANGE UP
WBC # BLD: 7.32 K/UL — SIGNIFICANT CHANGE UP (ref 3.8–10.5)
WBC # FLD AUTO: 7.32 K/UL — SIGNIFICANT CHANGE UP (ref 3.8–10.5)
WBC UR QL: SIGNIFICANT CHANGE UP /HPF (ref 0–5)

## 2022-11-12 PROCEDURE — 71045 X-RAY EXAM CHEST 1 VIEW: CPT | Mod: 26

## 2022-11-12 PROCEDURE — 99220: CPT

## 2022-11-12 PROCEDURE — 93308 TTE F-UP OR LMTD: CPT | Mod: 26

## 2022-11-12 PROCEDURE — 93010 ELECTROCARDIOGRAM REPORT: CPT

## 2022-11-12 RX ORDER — SODIUM CHLORIDE 9 MG/ML
1000 INJECTION INTRAMUSCULAR; INTRAVENOUS; SUBCUTANEOUS ONCE
Refills: 0 | Status: COMPLETED | OUTPATIENT
Start: 2022-11-12 | End: 2022-11-12

## 2022-11-12 RX ORDER — ASPIRIN/CALCIUM CARB/MAGNESIUM 324 MG
162 TABLET ORAL ONCE
Refills: 0 | Status: COMPLETED | OUTPATIENT
Start: 2022-11-12 | End: 2022-11-12

## 2022-11-12 RX ORDER — LOSARTAN POTASSIUM 100 MG/1
100 TABLET, FILM COATED ORAL DAILY
Refills: 0 | Status: DISCONTINUED | OUTPATIENT
Start: 2022-11-12 | End: 2022-11-13

## 2022-11-12 RX ORDER — LEVOTHYROXINE SODIUM 125 MCG
75 TABLET ORAL DAILY
Refills: 0 | Status: DISCONTINUED | OUTPATIENT
Start: 2022-11-12 | End: 2022-11-15

## 2022-11-12 RX ORDER — ONDANSETRON 8 MG/1
4 TABLET, FILM COATED ORAL ONCE
Refills: 0 | Status: COMPLETED | OUTPATIENT
Start: 2022-11-12 | End: 2022-11-12

## 2022-11-12 RX ORDER — HYDROCHLOROTHIAZIDE 25 MG
12.5 TABLET ORAL DAILY
Refills: 0 | Status: DISCONTINUED | OUTPATIENT
Start: 2022-11-12 | End: 2022-11-15

## 2022-11-12 RX ORDER — ATORVASTATIN CALCIUM 80 MG/1
10 TABLET, FILM COATED ORAL AT BEDTIME
Refills: 0 | Status: DISCONTINUED | OUTPATIENT
Start: 2022-11-12 | End: 2022-11-15

## 2022-11-12 RX ORDER — ACETAMINOPHEN 500 MG
1000 TABLET ORAL ONCE
Refills: 0 | Status: COMPLETED | OUTPATIENT
Start: 2022-11-12 | End: 2022-11-12

## 2022-11-12 RX ADMIN — ONDANSETRON 4 MILLIGRAM(S): 8 TABLET, FILM COATED ORAL at 11:17

## 2022-11-12 RX ADMIN — Medication 400 MILLIGRAM(S): at 23:38

## 2022-11-12 RX ADMIN — Medication 1000 MILLIGRAM(S): at 23:58

## 2022-11-12 RX ADMIN — SODIUM CHLORIDE 1000 MILLILITER(S): 9 INJECTION INTRAMUSCULAR; INTRAVENOUS; SUBCUTANEOUS at 11:17

## 2022-11-12 RX ADMIN — ATORVASTATIN CALCIUM 10 MILLIGRAM(S): 80 TABLET, FILM COATED ORAL at 22:21

## 2022-11-12 RX ADMIN — Medication 162 MILLIGRAM(S): at 11:17

## 2022-11-12 NOTE — ED PROVIDER NOTE - CLINICAL SUMMARY MEDICAL DECISION MAKING FREE TEXT BOX
81 y/o female with HTN, HLD, hypothyroidism, vertigo presenting with chest pain and shortness of breath, associated nausea/vomiting/diarrhea and left upper extremity numbness/tingling, hemodynamically stable but uncomfortable appearing, EKG nonischemic but chest pain onset around 10am, no signs of peripheral overload or evidence of DVT. Hx c/f ACS; will give aspirin, repeat EKG for dynamic changes, cardiac enzymes with 3hr trop given symptom onset. No FND with equal radial pulses/BP b/l; low suspicion for AD; pending cardiac labs/CXR and will reassess

## 2022-11-12 NOTE — ED CDU PROVIDER INITIAL DAY NOTE - MEDICAL DECISION MAKING DETAILS
CDU AXEL aWtkins:  83 y/o well appearing F with h/o HTN, HLD, Hypothyroidism pw left sided CP radiating to LUE starting today approx 10am while at rest associated with LUE numbness/tingling, SOB, nausea. Patient states prior episodes of chest discomfort in which she was suppose to have a cardiac work up, but did not. Bedside Echo performed by Dr. Sun revealing no pericardial effusion. Serial CE's x 2 negative thus far with stable EKG. Patient sent to CDU for cardiac monitoring, Tele doc consult, Echo and Stress test in am

## 2022-11-12 NOTE — ED ADULT NURSE NOTE - WAS YOUR LAST COVID-19 VACCINE GREATER THAN OR EQUAL TO TWO MONTHS AGO?
Anesthetic History   No history of anesthetic complications            Review of Systems / Medical History  Patient summary reviewed and pertinent labs reviewed    Pulmonary  Within defined limits                 Neuro/Psych   Within defined limits           Cardiovascular                  Exercise tolerance: >4 METS     GI/Hepatic/Renal  Within defined limits              Endo/Other        Obesity    Comments: Missed AB Other Findings   Comments: Documentation of current medication  Current medications obtained, documented and obtained? YES      Risk Factors for Postoperative nausea/vomiting:       History of postoperative nausea/vomiting? NO       Female? YES       Motion sickness? NO       Intended opioid administration for postoperative analgesia? YES      Smoking Abstinence:  Current Smoker? NO  Elective Surgery? YES  Seen preoperatively by anesthesiologist or proxy prior to day of surgery? YES  Pt abstained from smoking 24 hours prior to anesthesia?  YES    Preventive care/screening for High Blood Pressure:  Aged 18 years and older: YES  Screened for high blood pressure: YES  Patients with high blood pressure referred to primary care provider   for BP management: YES                 Physical Exam    Airway  Mallampati: I    Neck ROM: normal range of motion   Mouth opening: Normal     Cardiovascular  Regular rate and rhythm,  S1 and S2 normal,  no murmur, click, rub, or gallop             Dental  No notable dental hx       Pulmonary  Breath sounds clear to auscultation               Abdominal  GI exam deferred       Other Findings            Anesthetic Plan    ASA: 2  Anesthesia type: general          Induction: Intravenous  Anesthetic plan and risks discussed with: Patient Yes

## 2022-11-12 NOTE — ED PROVIDER NOTE - OBJECTIVE STATEMENT
81 y/o female with HTN, HLD, hypothyroidism, vertigo presenting with chest pain and shortness of breath. Patient woke up this morning with nausea and multiple episodes of nonbloody emesis, nonbloody diarrhea, and generalized weakness. Around 10 AM patient still started to develop left-sided chest pain radiating to left shoulder with associated left upper extremity numbness/tingling with no LOC, focal weakness, palpitations, diaphoresis, fevers, cough, rashes, or changes in urination. No hx of blood clots, leg swelling/pain, hemoptysis, recent travel/bed bound nature. Sees Dr. Cantu (cardiologist) and supposed to have echo/stress in December.

## 2022-11-12 NOTE — ED ADULT TRIAGE NOTE - NS ED TRIAGE AVPU SCALE
No difficulties
Alert-The patient is alert, awake and responds to voice. The patient is oriented to time, place, and person. The triage nurse is able to obtain subjective information.

## 2022-11-12 NOTE — ED PROVIDER NOTE - PHYSICAL EXAMINATION
Gen: WDWN, mildly uncomfortable appearing, hemodynamically stable    HEENT: No nasal discharge, mucous membranes dry, no oropharyngeal edema/erythema/exudates   CV: RRR, +S1/S2, no M/R/G, equal b/l radial pulses 2+  Resp: CTAB, no W/R/R, SPO2 >95% on RA, no increased WOB   GI: Abdomen soft non-distended, NTTP, no masses/organomegaly   MSK/Skin: No CVA tenderness, no open wounds, no bruising, no LE edema/calf tenderness   Neuro: CN2-12 grossly intact, A&Ox4, MS +5/5 in UE and LE BL, gross sensation intact in UE and LE BL  Psych: appropriate mood

## 2022-11-12 NOTE — ED ADULT NURSE NOTE - OBJECTIVE STATEMENT
pt is an 82 yr old female woke up with am with abdominal pain with n/v x 1 episode with chest pain non radiating, + SOb, weakness, dizziness. pt denies fevers. see emar for tx at this time. #20 g piv placed in left ac, labs sent. pt to xray at this time. SR noted on CM prior to transport.

## 2022-11-12 NOTE — ED PROVIDER NOTE - ATTENDING CONTRIBUTION TO CARE
Attending note:   After face to face evaluation of this patient, I concur with above noted hx, pe, and care plan for this patient.  Sun: 82-year-old female with a history of hypertension hyperlipidemia hypothyroidism and vertigo.  Patient comes in with sudden onset of chest pain and shortness of breath that started 10 AM this morning.  Chest pain described as pressure-like sensation in the left chest radiating down the left arm with numbness and tingling with some associated shortness of breath.  Patient denies any palpitations or sweating but did have some nausea and vomiting.  Patient denies any cough or fevers or chills.  On initial exam patient appeared to be in mild respiratory distress but denied pain at the time.  Patient's lungs were clear heart was regular with no murmurs and there is no tenderness to palpation on chest exam.  Patient's pulses were equal in all extremities.  Patient's abdomen is soft and nontender.  There is no pitting edema.  EKG is normal sinus rhythm with non specific changes.  Patient symptoms are concerning for ACS and patient was to have echo and stress in the past but was unable to schedule.  We will check labs, keep patient on telemetry monitor, chest x-ray and placed in CDU for stress and echo.  Patient is agreeable with plan and family also agrees.

## 2022-11-12 NOTE — ED CDU PROVIDER INITIAL DAY NOTE - OBJECTIVE STATEMENT
81 y/o female with HTN, HLD, hypothyroidism, vertigo presenting with chest pain and shortness of breath. Patient woke up this morning with nausea and multiple episodes of nonbloody emesis, nonbloody diarrhea, and generalized weakness. Around 10 AM patient still started to develop left-sided chest pain radiating to left shoulder with associated left upper extremity numbness/tingling with no LOC, focal weakness, palpitations, diaphoresis, fevers, cough, rashes, or changes in urination. No hx of blood clots, leg swelling/pain, hemoptysis, recent travel/bed bound nature. Sees Dr. Cantu (cardiologist) and supposed to have echo/stress in December.    CDU AXEL Watkins:  HPI reviewed above. 81 y/o well appearing F with h/o HTN, HLD, Hypothyroidism pw left sided CP radiating to LUE starting today approx 10am while at rest associated with LUE numbness/tingling, SOB, nausea. Patient states prior episodes of chest discomfort in which she was suppose to have a cardiac work up, but did not. Bedside Echo performed by Dr. Sun revealing no pericardial effusion. Serial CE's x 2 negative thus far with stable EKG. Patient sent to CDU for cardiac monitoring, Tele doc consult, Echo and Stress test in am

## 2022-11-12 NOTE — ED ADULT NURSE REASSESSMENT NOTE - NS ED NURSE REASSESS COMMENT FT1
Pt denies chest pain, SOB. Pt reported dysuria. Alexander Hernandez was informed. call bell with in reach. call bell with in reach. assistance wit ADLs provided., safety maintained. will continue to monitor.

## 2022-11-13 VITALS
OXYGEN SATURATION: 98 % | RESPIRATION RATE: 16 BRPM | SYSTOLIC BLOOD PRESSURE: 122 MMHG | DIASTOLIC BLOOD PRESSURE: 66 MMHG | TEMPERATURE: 99 F | HEART RATE: 74 BPM

## 2022-11-13 LAB
ANION GAP SERPL CALC-SCNC: 10 MMOL/L — SIGNIFICANT CHANGE UP (ref 7–14)
BLOOD GAS VENOUS COMPREHENSIVE RESULT: SIGNIFICANT CHANGE UP
BUN SERPL-MCNC: 13 MG/DL — SIGNIFICANT CHANGE UP (ref 7–23)
CALCIUM SERPL-MCNC: 9.2 MG/DL — SIGNIFICANT CHANGE UP (ref 8.4–10.5)
CHLORIDE SERPL-SCNC: 104 MMOL/L — SIGNIFICANT CHANGE UP (ref 98–107)
CHOLEST SERPL-MCNC: 173 MG/DL — SIGNIFICANT CHANGE UP
CO2 SERPL-SCNC: 28 MMOL/L — SIGNIFICANT CHANGE UP (ref 22–31)
CREAT SERPL-MCNC: 0.69 MG/DL — SIGNIFICANT CHANGE UP (ref 0.5–1.3)
EGFR: 88 ML/MIN/1.73M2 — SIGNIFICANT CHANGE UP
GLUCOSE SERPL-MCNC: 113 MG/DL — HIGH (ref 70–99)
HDLC SERPL-MCNC: 70 MG/DL — SIGNIFICANT CHANGE UP
LIPID PNL WITH DIRECT LDL SERPL: 91 MG/DL — SIGNIFICANT CHANGE UP
NON HDL CHOLESTEROL: 103 MG/DL — SIGNIFICANT CHANGE UP
POTASSIUM SERPL-MCNC: 4 MMOL/L — SIGNIFICANT CHANGE UP (ref 3.5–5.3)
POTASSIUM SERPL-SCNC: 4 MMOL/L — SIGNIFICANT CHANGE UP (ref 3.5–5.3)
SODIUM SERPL-SCNC: 142 MMOL/L — SIGNIFICANT CHANGE UP (ref 135–145)
TRIGL SERPL-MCNC: 60 MG/DL — SIGNIFICANT CHANGE UP

## 2022-11-13 PROCEDURE — 93016 CV STRESS TEST SUPVJ ONLY: CPT | Mod: GC

## 2022-11-13 PROCEDURE — 78452 HT MUSCLE IMAGE SPECT MULT: CPT | Mod: 26,ME

## 2022-11-13 PROCEDURE — 93306 TTE W/DOPPLER COMPLETE: CPT | Mod: 26

## 2022-11-13 PROCEDURE — 99217: CPT

## 2022-11-13 PROCEDURE — 93018 CV STRESS TEST I&R ONLY: CPT | Mod: GC

## 2022-11-13 RX ORDER — POTASSIUM CHLORIDE 20 MEQ
40 PACKET (EA) ORAL ONCE
Refills: 0 | Status: COMPLETED | OUTPATIENT
Start: 2022-11-13 | End: 2022-11-13

## 2022-11-13 RX ORDER — ATORVASTATIN CALCIUM 80 MG/1
1 TABLET, FILM COATED ORAL
Qty: 0 | Refills: 0 | DISCHARGE

## 2022-11-13 RX ORDER — VALSARTAN 80 MG/1
160 TABLET ORAL DAILY
Refills: 0 | Status: DISCONTINUED | OUTPATIENT
Start: 2022-11-13 | End: 2022-11-15

## 2022-11-13 RX ORDER — LEVOTHYROXINE SODIUM 125 MCG
1 TABLET ORAL
Qty: 0 | Refills: 0 | DISCHARGE

## 2022-11-13 RX ORDER — ASPIRIN/CALCIUM CARB/MAGNESIUM 324 MG
81 TABLET ORAL DAILY
Refills: 0 | Status: DISCONTINUED | OUTPATIENT
Start: 2022-11-13 | End: 2022-11-15

## 2022-11-13 RX ADMIN — VALSARTAN 160 MILLIGRAM(S): 80 TABLET ORAL at 06:28

## 2022-11-13 RX ADMIN — Medication 12.5 MILLIGRAM(S): at 06:28

## 2022-11-13 RX ADMIN — Medication 75 MICROGRAM(S): at 05:59

## 2022-11-13 RX ADMIN — Medication 81 MILLIGRAM(S): at 11:29

## 2022-11-13 RX ADMIN — Medication 40 MILLIEQUIVALENT(S): at 11:29

## 2022-11-13 NOTE — ED CDU PROVIDER SUBSEQUENT DAY NOTE - ATTENDING APP SHARED VISIT CONTRIBUTION OF CARE
Attending note:   After face to face evaluation of this patient, I concur with above noted hx, pe, and care plan for this patient.  Sun: Patient is an 82-year-old female with hypertension hyperlipidemia and hypothyroidism.  Patient presented to the ED with substernal chest pain starting suddenly with radiation to the left arm with numbness and tingling associate with shortness of breath and nausea.  Patient's symptoms continued while in the ED but resolved shortly after.  In ED patient's EKG showed no significant findings and troponins were negative.  Patient placed in CDU for stress test and echo.  Overnight patient had brief episode of lower abdominal pain that resolved when she urinated.  Patient requesting urine culture be sent.  Currently patient is asymptomatic patient's vitals are stable.  Heart is regular rate and rhythm with no murmurs noted.  And lungs are clear bilaterally.  There is no JVD and no pitting edema.  Patient's abdomen is soft and nontender including the lower abdomen and there is no CVA tenderness.  We will continue to monitor patient on telemetry while awaiting stress and echo results.

## 2022-11-13 NOTE — ED CDU PROVIDER DISPOSITION NOTE - NSFOLLOWUPINSTRUCTIONS_ED_ALL_ED_FT
Follow up with your primary care doctor within 1 week  Follow up with cardiology ___  Continue taking medications as previously prescribed to you  Return to the ER with any worsening or concerning symptoms, chest pain, shortness of breath, weakness, dizziness, feeling faint or any other concerns. Follow up with your primary care doctor within 1 week  Follow up with cardiology within 1 week, office information attached for [facility], they will also be reaching out to you to schedule follow up  Continue taking medications as previously prescribed to you  Return to the ER with any worsening or concerning symptoms, chest pain, shortness of breath, weakness, dizziness, feeling faint or any other concerns.

## 2022-11-13 NOTE — ED CDU PROVIDER DISPOSITION NOTE - CARE PROVIDER_API CALL
Abdullahi Weaver)  Cardiology  [address], [address]  [place] [address]  Phone: [phone]  Fax: [phone]  Follow Up Time:

## 2022-11-13 NOTE — ED CDU PROVIDER DISPOSITION NOTE - PATIENT PORTAL LINK FT
You can access the FollowMyHealth Patient Portal offered by [facility] by registering at the following website: [email or web address]. By joining [facility]’s FollowMyHealth portal, you will also be able to view your health information using other applications (apps) compatible with our system.

## 2022-11-13 NOTE — ED CDU PROVIDER DISPOSITION NOTE - CLINICAL COURSE
82yF w/PMHx HTN, HLD, Hypothyroidism p/w left sided CP radiating to LUE starting 11/12/22 ~10am while at rest a/w LUE numbness/tingling, SOB, nausea. Patient states prior episodes of chest discomfort in which she was suppose to have a cardiac work up, but did not. Bedside Echo performed by Dr. Sun revealing no pericardial effusion.  Trop 6 --->6, with stable EKG. Patient dispo'd to CDU for cardiac monitoring, Tele doc consult, Echo and Stress test in am.  In the interim, pt objectively noted to be resting comfortably; pt has been clinically stable; no issues thus far. 82yF w/PMHx HTN, HLD, Hypothyroidism p/w left sided CP radiating to LUE starting 11/12/22 ~10am while at rest a/w LUE numbness/tingling, SOB, nausea. Patient states prior episodes of chest discomfort in which she was supposed to have a cardiac work up, but did not. Bedside Echo performed by Dr. Sun revealing no pericardial effusion.  Trop 6 --->6, with stable EKG. Patient sent to CDU for cardiac monitoring, echo, stress test, tele doc evaluation. This morning pt reports she is feeling well, had self limited episode of suprapubic abd pain, urine culture pending. 82yF w/PMHx HTN, HLD, Hypothyroidism p/w left sided CP radiating to LUE starting 11/12/22 ~10am while at rest a/w LUE numbness/tingling, SOB, nausea. Patient states prior episodes of chest discomfort in which she was supposed to have a cardiac work up, but did not. Bedside Echo performed by Dr. Sun revealing no pericardial effusion.  Trop 6 --->6, with stable EKG. Patient sent to CDU for cardiac monitoring, echo, stress test, tele doc evaluation. This morning pt reports she is feeling well, had self limited episode of suprapubic abd pain, urine culture pending. Stress test resulted normal, echo results within normal. pt reports she is feeling well, no complaints at this time, vitals stable. Pt seen by tele doc santa Lopez for discharge from cardiac perspective with outpatient follow up. Pt will f/u with her PMD and cardiology, she will return to the ER with any worsening or concerning symptoms.

## 2022-11-13 NOTE — CONSULT NOTE ADULT - SUBJECTIVE AND OBJECTIVE BOX
C A R D I O L O G Y  *********************    DATE OF SERVICE: 11-13-22    HISTORY OF PRESENT ILLNESS: HPI:  Pt is an 82yF w/ PMHx HTN, HLD, Hypothyroidism p/w left sided CP radiating to LUE starting 11/12/22 ~10am while at rest a/w LUE numbness/tingling, SOB, nausea. Patient states prior episodes of chest discomfort in which she was supposed to have a cardiac work up, but did not. Bedside Echo performed by Dr. Sun revealing no pericardial effusion.  Trop 6 --->6, with stable EKG. Patient sent to CDU for cardiac monitoring, echo, stress test, tele doc evaluation. This morning pt reports she is feeling well, had self limited episode of suprapubic abd pain, urine culture pending.      PAST MEDICAL & SURGICAL HISTORY:  Hypothyroid  HTN (hypertension)  Hyperlipidemia  Vertigo  No significant past surgical history    MEDICATIONS:  MEDICATIONS  (STANDING):  aspirin enteric coated 81 milliGRAM(s) Oral daily  atorvastatin 10 milliGRAM(s) Oral at bedtime  hydrochlorothiazide 12.5 milliGRAM(s) Oral daily  levothyroxine 75 MICROGram(s) Oral daily  valsartan 160 milliGRAM(s) Oral daily      Allergies  No Known Allergies    FAMILY HISTORY:  No pertinent family history in first degree relatives    Non-contributary for premature coronary disease or sudden cardiac death    SOCIAL HISTORY:    [X ] Non-smoker  [ ] Smoker  [ ] Alcohol      REVIEW OF SYSTEMS:  [X ]chest pain  [  ]shortness of breath  [  ]palpitations  [  ]syncope  [ ]near syncope [ ]upper extremity weakness   [ ] lower extremity weakness  [  ]diplopia  [  ]altered mental status   [  ]fevers  [ ]chills [ ]nausea  [ ]vomiting  [  ]dysphagia    [ ]abdominal pain  [ ]melena  [ ]BRBPR    [  ]epistaxis  [  ]rash    [ ]lower extremity edema    [X] All others negative	  [ ] Unable to obtain      LABS:	 	    CARDIAC MARKERS:                        14.0   7.32  )-----------( 268      ( 12 Nov 2022 11:00 )             43.1     Hb Trend:     11-13    142  |  104  |  13  ----------------------------<  113<H>  4.0   |  28  |  0.69    Ca    9.2      13 Nov 2022 07:08  Mg     2.00     11-12    TPro  7.8  /  Alb  4.5  /  TBili  1.0  /  DBili  x   /  AST  17  /  ALT  16  /  AlkPhos  87  11-12    Creatinine Trend: 0.69<--, 0.64<--    PHYSICAL EXAM:  T(C): 37 (11-13-22 @ 14:08), Max: 37.1 (11-13-22 @ 10:16)  HR: 82 (11-13-22 @ 14:08) (78 - 88)  BP: 104/57 (11-13-22 @ 14:08) (104/57 - 144/87)  RR: 18 (11-13-22 @ 14:08) (14 - 19)  SpO2: 100% (11-13-22 @ 14:08) (100% - 100%)  Wt(kg): --     I&O's Summary    General: Well nourished in no acute distress. Alert and Oriented * 3.   Head: Normocephalic and atraumatic.   Neck: No JVD. No bruits. Supple. Does not appear to be enlarged.   Cardiovascular: + S1,S2 ; RRR Soft systolic murmur at the left lower sternal border. No rubs noted.    Lungs: CTA b/l. No rhonchi, rales or wheezes.   Abdomen: + BS, soft. Non tender. Non distended. No rebound. No guarding.   Extremities: No clubbing/cyanosis/edema.   Neurologic: Moves all four extremities. Full range of motion.   Skin: Warm and moist. The patient's skin has normal elasticity and good skin turgor.   Psychiatric: Appropriate mood and affect.  Musculoskeletal: Normal range of motion, normal strength       TELEMETRY: NSR	      ECG:  NSR Non specific changes	    RADIOLOGY:  CXR:   IMPRESSION:  Clear lungs.      TTE:  CONCLUSIONS:  1. Calcified trileaflet aortic valve with normal opening.  2. Normal left ventricular internal dimensions and wall  thicknesses.  3. Normal left ventricular systolic function. No segmental  wall motion abnormalities.  4. Normal right ventricular size and function.    Nuclear:  IMPRESSIONS:Normal Study  * Myocardial Perfusion SPECT results are normal.  * Review of raw data shows: The study is of good technical  quality.  * The left ventricle was small in size. Normal myocardial  perfusion scan,with no evidence of infarction or inducible  ischemia.  * Post-stress gated wall motion analysis was performed  (LVEF > 70%;LVEDV = 42 ml.), revealing normal LV function.  There were no segmental wall motion abnormalities. RV  function appeared normal.  ASSESSMENT/PLAN: 	82y Female     C A R D I O L O G Y  *********************    DATE OF SERVICE: 11-13-22    HISTORY OF PRESENT ILLNESS: HPI:  Pt is an 82yF w/ PMHx HTN, HLD, Hypothyroidism p/w left sided CP    She states last night she ate rice and beans around 7, then went to sleep around midnight. Woke up in the morning with dizziness, started to feel abdominal pain and nausea and then started vomiting. Reports pain after vomiting on the left side  and sharp also reported SOB. Denies any orthopnea, PND, palpitations or claudication like symptoms,    PAST MEDICAL & SURGICAL HISTORY:  Hypothyroid  HTN (hypertension)  Hyperlipidemia  Vertigo  No significant past surgical history    MEDICATIONS:  MEDICATIONS  (STANDING):  aspirin enteric coated 81 milliGRAM(s) Oral daily  atorvastatin 10 milliGRAM(s) Oral at bedtime  hydrochlorothiazide 12.5 milliGRAM(s) Oral daily  levothyroxine 75 MICROGram(s) Oral daily  valsartan 160 milliGRAM(s) Oral daily      Allergies  No Known Allergies    FAMILY HISTORY:  No pertinent family history in first degree relatives    Non-contributary for premature coronary disease or sudden cardiac death    SOCIAL HISTORY:    [X ] Non-smoker  [ ] Smoker  [ ] Alcohol      REVIEW OF SYSTEMS:  [X ]chest pain  [  ]shortness of breath  [  ]palpitations  [  ]syncope  [ ]near syncope [ ]upper extremity weakness   [ ] lower extremity weakness  [  ]diplopia  [  ]altered mental status   [  ]fevers  [ ]chills [ ]nausea  [ ]vomiting  [  ]dysphagia    [ ]abdominal pain  [ ]melena  [ ]BRBPR    [  ]epistaxis  [  ]rash    [ ]lower extremity edema    [X] All others negative	  [ ] Unable to obtain      LABS:	 	    CARDIAC MARKERS:                        14.0   7.32  )-----------( 268      ( 12 Nov 2022 11:00 )             43.1     Hb Trend:     11-13    142  |  104  |  13  ----------------------------<  113<H>  4.0   |  28  |  0.69    Ca    9.2      13 Nov 2022 07:08  Mg     2.00     11-12    TPro  7.8  /  Alb  4.5  /  TBili  1.0  /  DBili  x   /  AST  17  /  ALT  16  /  AlkPhos  87  11-12    Creatinine Trend: 0.69<--, 0.64<--    PHYSICAL EXAM:  T(C): 37 (11-13-22 @ 14:08), Max: 37.1 (11-13-22 @ 10:16)  HR: 82 (11-13-22 @ 14:08) (78 - 88)  BP: 104/57 (11-13-22 @ 14:08) (104/57 - 144/87)  RR: 18 (11-13-22 @ 14:08) (14 - 19)  SpO2: 100% (11-13-22 @ 14:08) (100% - 100%)  Wt(kg): --     I&O's Summary    General: Well nourished in no acute distress. Alert and Oriented * 3.   Head: Normocephalic and atraumatic.   Neck: No JVD. No bruits. Supple. Does not appear to be enlarged.   Cardiovascular: + S1,S2 ; RRR Soft systolic murmur at the left lower sternal border. No rubs noted.    Lungs: CTA b/l. No rhonchi, rales or wheezes.   Abdomen: + BS, soft. Non tender. Non distended. No rebound. No guarding.   Extremities: No clubbing/cyanosis/edema.   Neurologic: Moves all four extremities. Full range of motion.   Skin: Warm and moist. The patient's skin has normal elasticity and good skin turgor.   Psychiatric: Appropriate mood and affect.  Musculoskeletal: Normal range of motion, normal strength       TELEMETRY: NSR	      ECG:  NSR Non specific changes	    RADIOLOGY:  CXR:   IMPRESSION:  Clear lungs.      TTE:  CONCLUSIONS:  1. Calcified trileaflet aortic valve with normal opening.  2. Normal left ventricular internal dimensions and wall  thicknesses.  3. Normal left ventricular systolic function. No segmental  wall motion abnormalities.  4. Normal right ventricular size and function.    Nuclear:  IMPRESSIONS:Normal Study  * Myocardial Perfusion SPECT results are normal.  * Review of raw data shows: The study is of good technical  quality.  * The left ventricle was small in size. Normal myocardial  perfusion scan,with no evidence of infarction or inducible  ischemia.  * Post-stress gated wall motion analysis was performed  (LVEF > 70%;LVEDV = 42 ml.), revealing normal LV function.  There were no segmental wall motion abnormalities. RV  function appeared normal.      ASSESSMENT/PLAN: Pt is an 82yF w/ PMHx HTN, HLD, Hypothyroidism p/w left sided CP.    1. Chest Pain  - non-cardiac  - non ischemic EKG, no events on tele, no segmental wall motion on echo and stress without inducible ischmia  - likely gastroenteritis symptoms, tx per ED  - can be discharged from cardiology standpoint  - [facility] with [facility]    Joie Julien MD  Pager: [phone]

## 2022-11-13 NOTE — ED CDU PROVIDER SUBSEQUENT DAY NOTE - HISTORY
81 yo female, PMH HTN, HLD, Hypothyroidism p/w left sided CP radiating to LUE starting 11/12/22 ~10am while at rest a/w LUE numbness/tingling, SOB, nausea. Patient states prior episodes of chest discomfort in which she was suppose to have a cardiac work up, but did not. Bedside Echo performed by Dr. Sun revealing no pericardial effusion.  Trop 6 --->6, with stable EKG. Patient dispo'd to CDU for cardiac monitoring, Tele doc consult, Echo and Stress test in am.  In the interim, pt objectively noted to be resting comfortably; pt has been clinically stable; no issues thus far.

## 2022-11-13 NOTE — ED CDU PROVIDER SUBSEQUENT DAY NOTE - PROGRESS NOTE DETAILS
Pt reassessed this morning, reports at approximately 6:30 this morning she had suprapubic pressure, felt like she needed to urinate. Urine culture was sent. Awaiting stress/echo, Tele doc evaluation. No events on tele thus far, will continue to monitor. Stress test resulted normal, echo results within normal. pt reports she is feeling well, no complaints at this time, vitals stable. Pt seen by tele doc santa Lopez for discharge from cardiac perspective with outpatient follow up. Pt will f/u with her PMD and cardiology, she will return to the ER with any worsening or concerning symptoms.

## 2022-11-13 NOTE — ED CDU PROVIDER SUBSEQUENT DAY NOTE - NSICDXPASTMEDICALHX_GEN_ALL_CORE_FT
[facility] ophthalmology and pt's daughter will try to get in sooner. PAST MEDICAL HISTORY:  HTN (hypertension)     Hyperlipidemia     Hypothyroid     Vertigo

## 2022-11-14 LAB
CULTURE RESULTS: SIGNIFICANT CHANGE UP
SPECIMEN SOURCE: SIGNIFICANT CHANGE UP

## 2023-03-07 NOTE — ED PROVIDER NOTE - MDM ORDERS SUBMITTED SELECTION
In the next few weeks you may receive a [employer] survey regarding your most recent clinic visit with us.  Please take a few moments to accurately evaluate your visit.  We strive for excellence and value your feedback.       If we need to contact you regarding any test results, we will make 2 attempts to reach you at the number you have listed during your office visit today.  If we are unable to reach you, a letter with your results and any further instructions will be mailed to you home.    Please do not hesitate to call our office with any questions or concerns at Dept: [phone].     You have had the following tests performed and reviewed with you today:    OCT Nerve - Scanning laser imaging of your optic nerve to assess glaucoma risk and monitor damage. Optic nerve swelling may also be examined. This test assesses structure.    
Labs/EKG/Imaging Studies/Medications

## 2023-05-18 NOTE — DATA REVIEWED
You have finished antibiotic course, and IV steroids  Take steroid taper until completed  Take over-the-counter antacid while taking the steroids  Follow-up with your primary care doctor, and pulmonologist    Thank you for involving me with your care, let me know if there is anything more I can do!        Adebayo Montes MD  Internal Medicine Staff        PATIENT GENERAL DISCHARGE INFORMATION   Things that YOU are responsible for to Manage Your Care At Home:  1. Getting your prescriptions filled.  2. Taking you medications as directed. (DO NOT MISS ANY DOSES!)  3. Going to your follow-up doctor appointments.                 *This is important because it allows the doctor to monitor your progress and make changes.      If you are unable to make your follow up appointments, please call the number listed and reschedule this appointment.   After discharge, if you need assistance, you can call [facility] On Call Nurse Care Line for 24/7 assistance at [phone]  If you are experience any signs or symptoms, Call your doctor or Call 911 and come to your nearest Emergency Room.    You should receive a call from [facility] within 48-72 hours to help manage your care after discharge.   Please try to make sure that you answer your phone for this important phone call.     
[No studies available for review at this time] : No studies available for review at this time
No

## 2023-07-26 NOTE — ED ADULT TRIAGE NOTE - AS TEMP SITE
----- Message from Meli Sousa, DO sent at 2/15/2023  3:30 PM CST -----  Pls notify of all negative labs. To stop spotting, she should should skip sugars tabs.   Have her take 3 tabs daily for 3 days, 2 tabs daily for 3 days then go into the next pill pack
Patient notified of results and recs. Patient wrote down instructions and read them back to RN 2 times. She will let us know know if spotting does not improve.
lmtcb
oral
<-- Click to add NO pertinent Family History

## 2023-12-21 NOTE — ED PROVIDER NOTE - BIRTH SEX
1. Bilateral hydronephrosis   2. Pelvic organ prolapse    2a. POP-Q 6/8/23: Aa: +3. Ba: +3 C: -1.5 Gh: 4.5. Pb: 4.5 TVL: 8 Ap: -1.5. Bp: -1.5. D: -4.   3. CT 4/19/23 suspicious for right sided bladder mass   4. Neuromyelitis opitica disease (NMO), with a history of transverse myelitis   4a. followed by Reji Ryan   4b. rituximab   5. Estrogen receptor positive left-sided breast cancer s/p resection  6. Graves disease   7. Chronic hep B on Entecavir   8. History of ovarian cysts   9. Sikhism, has DPA  10. 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 Hx endometrial hyperplasia and underwent an ablation  12. low flow and high pressure bladder outlet obstruction on UDS 9/7/23    12a. ?secondary to prolapse, improved emptying with reduction of prolapse    HISTORY OF PRESENT ILLNESS:  Last seen by myself 10/31/23. This is a  73 y.o. female who presents for follow-up. She tried a pessary which came out with BM. She has breast CA and denies BRACA +. She is Sikhism, has DPA. She had  hysteroscopy for PMB 2/5/16.     Review of Systems   Constitutional: Negative.    HENT: Negative.     Eyes: Negative.    Respiratory: Negative.     Cardiovascular: Negative.    Gastrointestinal: Negative.    Endocrine: Negative.    Genitourinary:         REFER TO HPI   Musculoskeletal: Negative.    Skin: Negative.    Allergic/Immunologic: Negative.    Neurological: Negative.    Hematological: Negative.    Psychiatric/Behavioral: Negative.       PHYSICAL EXAMINATION:  No LMP recorded. Patient is postmenopausal.  Body mass index is 31.18 kg/m².  Visit Vitals  /73   Pulse 84   Temp 35.9 °C (96.6 °F)   Wt 79.8 kg (176 lb)   BMI 31.18 kg/m²   OB Status Postmenopausal   Smoking Status Former   BSA 1.88 m²       NP: Constitutional: alert and in no acute distress, well developed, well nourished.   Head and Face: head and face: unremarkable.   Neck: no neck asymmetry, supple.    Pulmonary: no respiratory distress, unremarkable respiratory effort.   Skin: normal skin color and pigmentation, normal skin turgor, and no rash.   Neurologic: cranial nerves: non-focal, grossly intact.   Psychiatric: alert and oriented x 3.     Urine dip:   No results found for this or any previous visit (from the past 6 hour(s)).    IMPRESSION AND PLAN:  Kristina Lundberg is a 73 y.o. female who presents for follow-up.     Problem List Items Addressed This Visit    None  Visit Diagnoses       Frequency of urination    -  Primary    Relevant Orders    POCT UA Automated manually resulted (Completed)    Uterovaginal prolapse               I personally reviewed the US PELVIS TRANSABDOMINAL WITH TRANSVAGINAL dated 11/16/23 with the patient today in clinic. FINDINGS:  UTERUS:  Size:  7.8 x 3.5 x 5.3 cm  Masses: Heterogeneous myometrium containing multiple uterine  fibroids, largest measuring up to 1.8 x 1.7 x 1.4 cm. Endometrial  thickness: 5.2 mm.  CERVIX:  Normal.  RIGHT OVARY:  Bilateral ovaries not visualized.  No adnexal mass seen.  FREE FLUID:   None.  IMPRESSION:  Multifibroid uterus.  Bilateral ovaries not visualized.  5.2 mm thick endometrium which is borderline thickened for a  postmenopausal female. Consider further gynecologic surveillance or  workup  Recommend biopsy of endometrial linging, but she had Cristobal-hysteroscopy for PMB 2/5/16  No current post-menopausal bleeding     We discussed her risk of abnormality on post-hysterectomy pathology in the context of her Hx PMB, even with a negative evaluation. We rediscussed her options for her stage III prolapse in great detail, including retrying pessary and surgical options. PFPT would not substatially improve her prolapse. It is my impression that, given her Hx PMB and endometrial findings on US, it may be best to proceed with vaginal hysterectomy, uterosacral ligament suspension, anterior repair, oophorectomy, and possible perineoplasty. We rediscussed risks  of prolapse repair, including occult bladder symptoms (including JORDAN), fistula, and recurrence. I asked her to defer estrogen cream use in the context of her other medical concerns, which we can rediscuss at a later time. Patient would like to proceed with pessary and follow-up with Beverly Peters CNP.     All questions and concerns were answered and addressed.  The patient expressed understanding and agrees with the plan.     Scribe Attestation  By signing my name below, I, Yaya Rendon   attest that this documentation has been prepared under the direction and in the presence of Alverto Berg MD.    Female

## 2024-12-04 NOTE — ED PROVIDER NOTE - CRANIAL NERVE AND PUPILLARY EXAM
central and peripheral vision intact/extra-ocular movements intact/cranial nerves 2-12 intact/tongue is midline no

## 2025-07-30 PROBLEM — E78.5 HYPERLIPIDEMIA, UNSPECIFIED: Chronic | Status: ACTIVE | Noted: 2022-11-13

## 2025-07-30 PROBLEM — I10 ESSENTIAL (PRIMARY) HYPERTENSION: Chronic | Status: ACTIVE | Noted: 2022-11-13

## 2025-07-30 PROBLEM — R42 DIZZINESS AND GIDDINESS: Chronic | Status: ACTIVE | Noted: 2022-11-13

## 2025-08-18 ENCOUNTER — APPOINTMENT (OUTPATIENT)
Dept: OTOLARYNGOLOGY | Facility: CLINIC | Age: 85
End: 2025-08-18
Payer: MEDICARE

## 2025-08-18 ENCOUNTER — NON-APPOINTMENT (OUTPATIENT)
Age: 85
End: 2025-08-18

## 2025-08-18 VITALS
BODY MASS INDEX: 23.78 KG/M2 | HEART RATE: 81 BPM | WEIGHT: 130 LBS | DIASTOLIC BLOOD PRESSURE: 62 MMHG | SYSTOLIC BLOOD PRESSURE: 107 MMHG | TEMPERATURE: 98 F

## 2025-08-18 DIAGNOSIS — R42 DIZZINESS AND GIDDINESS: ICD-10-CM

## 2025-08-18 DIAGNOSIS — H92.01 OTALGIA, RIGHT EAR: ICD-10-CM

## 2025-08-18 DIAGNOSIS — K21.9 GASTRO-ESOPHAGEAL REFLUX DISEASE W/OUT ESOPHAGITIS: ICD-10-CM

## 2025-08-18 DIAGNOSIS — R07.0 PAIN IN THROAT: ICD-10-CM

## 2025-08-18 PROCEDURE — 31575 DIAGNOSTIC LARYNGOSCOPY: CPT

## 2025-08-18 PROCEDURE — 92567 TYMPANOMETRY: CPT

## 2025-08-18 PROCEDURE — 92557 COMPREHENSIVE HEARING TEST: CPT

## 2025-08-18 PROCEDURE — 99204 OFFICE O/P NEW MOD 45 MIN: CPT | Mod: 25

## 2025-08-18 RX ORDER — OMEPRAZOLE 20 MG/1
20 CAPSULE, DELAYED RELEASE ORAL
Qty: 90 | Refills: 0 | Status: ACTIVE | COMMUNITY
Start: 2025-08-18 | End: 1900-01-01

## 2025-09-07 ENCOUNTER — EMERGENCY (EMERGENCY)
Facility: HOSPITAL | Age: 85
LOS: 1 days | End: 2025-09-07
Attending: EMERGENCY MEDICINE | Admitting: EMERGENCY MEDICINE
Payer: MEDICARE

## 2025-09-07 VITALS
SYSTOLIC BLOOD PRESSURE: 136 MMHG | HEIGHT: 61 IN | RESPIRATION RATE: 18 BRPM | DIASTOLIC BLOOD PRESSURE: 83 MMHG | WEIGHT: 110.01 LBS | TEMPERATURE: 98 F | OXYGEN SATURATION: 98 % | HEART RATE: 89 BPM

## 2025-09-07 VITALS
SYSTOLIC BLOOD PRESSURE: 102 MMHG | OXYGEN SATURATION: 98 % | RESPIRATION RATE: 16 BRPM | HEART RATE: 89 BPM | TEMPERATURE: 98 F | DIASTOLIC BLOOD PRESSURE: 58 MMHG

## 2025-09-07 LAB
ALBUMIN SERPL ELPH-MCNC: 4.2 G/DL — SIGNIFICANT CHANGE UP (ref 3.3–5)
ALP SERPL-CCNC: 75 U/L — SIGNIFICANT CHANGE UP (ref 40–120)
ALT FLD-CCNC: 12 U/L — SIGNIFICANT CHANGE UP (ref 4–33)
ANION GAP SERPL CALC-SCNC: 13 MMOL/L — SIGNIFICANT CHANGE UP (ref 7–14)
APTT BLD: 28.5 SEC — SIGNIFICANT CHANGE UP (ref 26.1–36.8)
AST SERPL-CCNC: 27 U/L — SIGNIFICANT CHANGE UP (ref 4–32)
BASOPHILS # BLD AUTO: 0.03 K/UL — SIGNIFICANT CHANGE UP (ref 0–0.2)
BASOPHILS NFR BLD AUTO: 0.5 % — SIGNIFICANT CHANGE UP (ref 0–2)
BILIRUB SERPL-MCNC: 0.7 MG/DL — SIGNIFICANT CHANGE UP (ref 0.2–1.2)
BUN SERPL-MCNC: 18 MG/DL — SIGNIFICANT CHANGE UP (ref 7–23)
CALCIUM SERPL-MCNC: 9.4 MG/DL — SIGNIFICANT CHANGE UP (ref 8.4–10.5)
CHLORIDE SERPL-SCNC: 98 MMOL/L — SIGNIFICANT CHANGE UP (ref 98–107)
CO2 SERPL-SCNC: 23 MMOL/L — SIGNIFICANT CHANGE UP (ref 22–31)
CREAT SERPL-MCNC: 0.71 MG/DL — SIGNIFICANT CHANGE UP (ref 0.5–1.3)
EGFR: 84 ML/MIN/1.73M2 — SIGNIFICANT CHANGE UP
EGFR: 84 ML/MIN/1.73M2 — SIGNIFICANT CHANGE UP
EOSINOPHIL # BLD AUTO: 0.23 K/UL — SIGNIFICANT CHANGE UP (ref 0–0.5)
EOSINOPHIL NFR BLD AUTO: 4.1 % — SIGNIFICANT CHANGE UP (ref 0–6)
GLUCOSE SERPL-MCNC: 123 MG/DL — HIGH (ref 70–99)
HCT VFR BLD CALC: 40.4 % — SIGNIFICANT CHANGE UP (ref 34.5–45)
HGB BLD-MCNC: 13.4 G/DL — SIGNIFICANT CHANGE UP (ref 11.5–15.5)
IMM GRANULOCYTES # BLD AUTO: 0.01 K/UL — SIGNIFICANT CHANGE UP (ref 0–0.07)
IMM GRANULOCYTES NFR BLD AUTO: 0.2 % — SIGNIFICANT CHANGE UP (ref 0–0.9)
INR BLD: 1.04 RATIO — SIGNIFICANT CHANGE UP (ref 0.85–1.16)
LYMPHOCYTES # BLD AUTO: 3.11 K/UL — SIGNIFICANT CHANGE UP (ref 1–3.3)
LYMPHOCYTES NFR BLD AUTO: 55.7 % — HIGH (ref 13–44)
MCHC RBC-ENTMCNC: 29.2 PG — SIGNIFICANT CHANGE UP (ref 27–34)
MCHC RBC-ENTMCNC: 33.2 G/DL — SIGNIFICANT CHANGE UP (ref 32–36)
MCV RBC AUTO: 88 FL — SIGNIFICANT CHANGE UP (ref 80–100)
MONOCYTES # BLD AUTO: 0.56 K/UL — SIGNIFICANT CHANGE UP (ref 0–0.9)
MONOCYTES NFR BLD AUTO: 10 % — SIGNIFICANT CHANGE UP (ref 2–14)
NEUTROPHILS # BLD AUTO: 1.64 K/UL — LOW (ref 1.8–7.4)
NEUTROPHILS NFR BLD AUTO: 29.5 % — LOW (ref 43–77)
NRBC # BLD AUTO: 0 K/UL — SIGNIFICANT CHANGE UP (ref 0–0)
NRBC # FLD: 0 K/UL — SIGNIFICANT CHANGE UP (ref 0–0)
NRBC BLD AUTO-RTO: 0 /100 WBCS — SIGNIFICANT CHANGE UP (ref 0–0)
PLATELET # BLD AUTO: 234 K/UL — SIGNIFICANT CHANGE UP (ref 150–400)
PMV BLD: 10.7 FL — SIGNIFICANT CHANGE UP (ref 7–13)
POTASSIUM SERPL-MCNC: 4.3 MMOL/L — SIGNIFICANT CHANGE UP (ref 3.5–5.3)
POTASSIUM SERPL-SCNC: 4.3 MMOL/L — SIGNIFICANT CHANGE UP (ref 3.5–5.3)
PROT SERPL-MCNC: 7.6 G/DL — SIGNIFICANT CHANGE UP (ref 6–8.3)
PROTHROM AB SERPL-ACNC: 12.1 SEC — SIGNIFICANT CHANGE UP (ref 9.9–13.4)
RBC # BLD: 4.59 M/UL — SIGNIFICANT CHANGE UP (ref 3.8–5.2)
RBC # FLD: 13 % — SIGNIFICANT CHANGE UP (ref 10.3–14.5)
SODIUM SERPL-SCNC: 134 MMOL/L — LOW (ref 135–145)
TROPONIN T, HIGH SENSITIVITY RESULT: <6 NG/L — SIGNIFICANT CHANGE UP
WBC # BLD: 5.58 K/UL — SIGNIFICANT CHANGE UP (ref 3.8–10.5)
WBC # FLD AUTO: 5.58 K/UL — SIGNIFICANT CHANGE UP (ref 3.8–10.5)

## 2025-09-07 PROCEDURE — 99291 CRITICAL CARE FIRST HOUR: CPT

## 2025-09-07 PROCEDURE — 70496 CT ANGIOGRAPHY HEAD: CPT | Mod: 26

## 2025-09-07 PROCEDURE — 93010 ELECTROCARDIOGRAM REPORT: CPT

## 2025-09-07 PROCEDURE — 70498 CT ANGIOGRAPHY NECK: CPT | Mod: 26

## 2025-09-07 PROCEDURE — 70450 CT HEAD/BRAIN W/O DYE: CPT | Mod: 26,XU

## 2025-09-07 RX ORDER — ACETAMINOPHEN 500 MG/5ML
1000 LIQUID (ML) ORAL ONCE
Refills: 0 | Status: COMPLETED | OUTPATIENT
Start: 2025-09-07 | End: 2025-09-07

## 2025-09-07 RX ORDER — MECLIZINE HCL 12.5 MG
25 TABLET ORAL ONCE
Refills: 0 | Status: COMPLETED | OUTPATIENT
Start: 2025-09-07 | End: 2025-09-07

## 2025-09-07 RX ORDER — METOCLOPRAMIDE HCL 10 MG
10 TABLET ORAL ONCE
Refills: 0 | Status: COMPLETED | OUTPATIENT
Start: 2025-09-07 | End: 2025-09-07

## 2025-09-07 RX ADMIN — Medication 25 MILLIGRAM(S): at 01:48

## 2025-09-07 RX ADMIN — Medication 400 MILLIGRAM(S): at 01:37

## 2025-09-07 RX ADMIN — Medication 10 MILLIGRAM(S): at 01:37
